# Patient Record
Sex: MALE | Race: WHITE | HISPANIC OR LATINO | Employment: PART TIME | URBAN - METROPOLITAN AREA
[De-identification: names, ages, dates, MRNs, and addresses within clinical notes are randomized per-mention and may not be internally consistent; named-entity substitution may affect disease eponyms.]

---

## 2021-04-14 LAB
CHOLESTEROL (EXTERNAL): 139 MG/DL
CREATININE (EXTERNAL): 0.81 MG/DL (ref 0.7–1.25)
GFR ESTIMATED (EXTERNAL): 92 ML/MIN/1.73M2
GFR ESTIMATED (IF AFRICAN AMERICAN) (EXTERNAL): 107 ML/MIN/1.73M2
GLUCOSE (EXTERNAL): 413 MG/DL (ref 65–99)
HDLC SERPL-MCNC: 30 MG/DL
LDL CHOLESTEROL (EXTERNAL): 68 MG/DL
NON HDL CHOLESTEROL (EXTERNAL): 109 MG/DL
POTASSIUM (EXTERNAL): 3.8 MMOL/L (ref 3.5–5.3)
TRIGLYCERIDES (EXTERNAL): 379 MG/DL

## 2021-05-03 LAB — HBA1C MFR BLD: 10.3 %

## 2021-06-02 ENCOUNTER — RECORDS - HEALTHEAST (OUTPATIENT)
Dept: ADMINISTRATIVE | Facility: CLINIC | Age: 66
End: 2021-06-02

## 2022-05-13 ENCOUNTER — TRANSFERRED RECORDS (OUTPATIENT)
Dept: HEALTH INFORMATION MANAGEMENT | Facility: CLINIC | Age: 67
End: 2022-05-13

## 2022-05-13 LAB
ALBUMIN (URINE) MG/SPEC: 0.5 MG/DL
ALBUMIN/CREATININE RATIO: 5 MCG/MG CREAT
CHOLESTEROL (EXTERNAL): 222 MG/DL
CREATININE (EXTERNAL): 0.83 MG/DL (ref 0.7–1.25)
CREATININE (URINE): 104 MG/DL (ref 20–230)
GFR ESTIMATED (EXTERNAL): 90 ML/MIN/1.73M2
GFR ESTIMATED (IF AFRICAN AMERICAN) (EXTERNAL): 105 ML/MIN/1.73M2
GLUCOSE (EXTERNAL): 354 MG/DL (ref 65–99)
HBA1C MFR BLD: 8.5 %
HDLC SERPL-MCNC: 32 MG/DL
NON HDL CHOLESTEROL (EXTERNAL): 190 MG/DL
POTASSIUM (EXTERNAL): 4.4 MMOL/L (ref 3.5–5.3)
TRIGLYCERIDES (EXTERNAL): 467 MG/DL
TSH SERPL-ACNC: 3.57 MIU/L (ref 0.4–4.5)

## 2022-09-09 ENCOUNTER — OFFICE VISIT (OUTPATIENT)
Dept: FAMILY MEDICINE | Facility: CLINIC | Age: 67
End: 2022-09-09
Payer: COMMERCIAL

## 2022-09-09 VITALS
DIASTOLIC BLOOD PRESSURE: 77 MMHG | SYSTOLIC BLOOD PRESSURE: 117 MMHG | RESPIRATION RATE: 18 BRPM | OXYGEN SATURATION: 100 % | HEART RATE: 97 BPM | WEIGHT: 134.12 LBS

## 2022-09-09 DIAGNOSIS — H69.92 DYSFUNCTION OF LEFT EUSTACHIAN TUBE: Primary | ICD-10-CM

## 2022-09-09 DIAGNOSIS — H93.12 TINNITUS, LEFT: ICD-10-CM

## 2022-09-09 PROCEDURE — 99203 OFFICE O/P NEW LOW 30 MIN: CPT | Mod: GC | Performed by: STUDENT IN AN ORGANIZED HEALTH CARE EDUCATION/TRAINING PROGRAM

## 2022-09-09 RX ORDER — FLUTICASONE PROPIONATE 50 MCG
2 SPRAY, SUSPENSION (ML) NASAL DAILY
Qty: 15.8 ML | Refills: 1 | Status: SHIPPED | OUTPATIENT
Start: 2022-09-09 | End: 2022-11-28

## 2022-09-09 NOTE — PROGRESS NOTES
"  Assessment & Plan     1. Dysfunction of left eustachian tube  Mild ear effusion suggests left eustachian tube dysfunction. Unclear etiology -- allergies (though doesn't fit with his usual spring time sx)? Evidence of some irritation in the ear likely 2/2 to topical alcohol. No evidence of OM vs OE vs other overt infection.    - Monitor   - Avoid alcohol, clearly discussed   - fluticasone (FLONASE) 50 MCG/ACT nasal spray; Spray 2 sprays into both nostrils daily  Dispense: 15.8 mL; Refill: 1  - Discussed warning signs as well as reasons to call ED vs clinic     2. Tinnitus, left  Most likely benign and related to age related hearing loss. Warrants testing given unilateral.   - Adult Audiology  Referral; Future    **Requested he return for follow up visit regarding his diabetes with his medications in-hand     *Regarding complexity of MDM:  1. Number/complexity of conditions: 2 uncomplicated new conditions    2. Amount/complexity of data reviewed/analyzed: low    3. Risk of complications of management: moderate - med management    Warrants level 40402 code (new pt)    Jewell Quintanilla DO  [facility]    Precepted with Dr. Juan Carr   Moustapha is a 67 year old presenting for the following health issues:  Ear Problem (Pressure on L causing pt to not hear well and when he eats he can feel it. 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 Not sure    Slowly came on progressively   Any swimming? No    Allergies? Sometimes - in spring, not in the summer or fall      Course: slowly getting more pronounced   Never prior to that     Exacerbating factors: nothing   Remitting factors: nothing. Did try some rubbing alcohol in the ear which didn't seem to help.    Symptoms associated/denied (as below):    "   Tinnitus -- yes. 3 years.   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 Alpha. None in the ear.     T - no   A - no   I - none         Review of Systems   Constitutional, HEENT, cardiovascular, pulmonary, GI, , musculoskeletal, neuro, skin, endocrine and psych systems are negative, except as otherwise noted.      Objective    /77   Pulse 97   Resp 18   Wt 60.8 kg (134 lb 1.9 oz)   SpO2 100%   There is no height or weight on file to calculate BMI.  Physical Exam     Gen: Pleasant. No distress.    HEENT: MMM.   Right ear: canal clear with TM gray and bony landmarks visualized without erythema nor purulence.    Left ear: no pain with pulling the external helix / tragus. canal clear, some evidence of canal irritation with mild TM effusion but landmarks well visualized without erythema nor purulence.   Neck: No overt asymmetry.   CV: Appears well-perfused.    Resp: Breathing comfortably on room air.   Abd: Non-distended, non-tender.   Ext: No edema or overt asymmetry/deformity.   Skin: No overt rash on easily visualized skin.   Neuro: Non-focal.   Psych: Calm.

## 2022-09-13 ENCOUNTER — TRANSFERRED RECORDS (OUTPATIENT)
Dept: HEALTH INFORMATION MANAGEMENT | Facility: CLINIC | Age: 67
End: 2022-09-13

## 2022-09-14 ENCOUNTER — OFFICE VISIT (OUTPATIENT)
Dept: FAMILY MEDICINE | Facility: CLINIC | Age: 67
End: 2022-09-14
Payer: COMMERCIAL

## 2022-09-14 VITALS
DIASTOLIC BLOOD PRESSURE: 78 MMHG | BODY MASS INDEX: 23.92 KG/M2 | OXYGEN SATURATION: 97 % | SYSTOLIC BLOOD PRESSURE: 120 MMHG | WEIGHT: 135 LBS | HEIGHT: 63 IN | TEMPERATURE: 97.6 F | HEART RATE: 99 BPM

## 2022-09-14 DIAGNOSIS — Z13.9 SCREENING FOR CONDITION: ICD-10-CM

## 2022-09-14 DIAGNOSIS — Z23 NEED FOR PROPHYLACTIC VACCINATION AND INOCULATION AGAINST INFLUENZA: ICD-10-CM

## 2022-09-14 DIAGNOSIS — E11.9 TYPE 2 DIABETES MELLITUS WITHOUT COMPLICATION, WITHOUT LONG-TERM CURRENT USE OF INSULIN (H): Primary | ICD-10-CM

## 2022-09-14 DIAGNOSIS — K08.9 POOR DENTITION: ICD-10-CM

## 2022-09-14 LAB
ANION GAP SERPL CALCULATED.3IONS-SCNC: 9 MMOL/L (ref 7–15)
BUN SERPL-MCNC: 13.6 MG/DL (ref 8–23)
CALCIUM SERPL-MCNC: 9.4 MG/DL (ref 8.8–10.2)
CHLORIDE SERPL-SCNC: 96 MMOL/L (ref 98–107)
CHOLEST SERPL-MCNC: 176 MG/DL
CREAT SERPL-MCNC: 0.5 MG/DL (ref 0.67–1.17)
DEPRECATED HCO3 PLAS-SCNC: 28 MMOL/L (ref 22–29)
GFR SERPL CREATININE-BSD FRML MDRD: >90 ML/MIN/1.73M2
GLUCOSE SERPL-MCNC: 394 MG/DL (ref 70–99)
HBA1C MFR BLD: 13.1 % (ref 0–5.6)
HDLC SERPL-MCNC: 45 MG/DL
LDLC SERPL CALC-MCNC: 98 MG/DL
NONHDLC SERPL-MCNC: 131 MG/DL
POTASSIUM SERPL-SCNC: 4.6 MMOL/L (ref 3.4–5.3)
SODIUM SERPL-SCNC: 133 MMOL/L (ref 136–145)
TRIGL SERPL-MCNC: 167 MG/DL

## 2022-09-14 PROCEDURE — G0008 ADMIN INFLUENZA VIRUS VAC: HCPCS | Performed by: STUDENT IN AN ORGANIZED HEALTH CARE EDUCATION/TRAINING PROGRAM

## 2022-09-14 PROCEDURE — 82043 UR ALBUMIN QUANTITATIVE: CPT | Performed by: STUDENT IN AN ORGANIZED HEALTH CARE EDUCATION/TRAINING PROGRAM

## 2022-09-14 PROCEDURE — 90662 IIV NO PRSV INCREASED AG IM: CPT | Performed by: STUDENT IN AN ORGANIZED HEALTH CARE EDUCATION/TRAINING PROGRAM

## 2022-09-14 PROCEDURE — 80048 BASIC METABOLIC PNL TOTAL CA: CPT | Performed by: STUDENT IN AN ORGANIZED HEALTH CARE EDUCATION/TRAINING PROGRAM

## 2022-09-14 PROCEDURE — G0009 ADMIN PNEUMOCOCCAL VACCINE: HCPCS | Performed by: STUDENT IN AN ORGANIZED HEALTH CARE EDUCATION/TRAINING PROGRAM

## 2022-09-14 PROCEDURE — 86803 HEPATITIS C AB TEST: CPT | Performed by: STUDENT IN AN ORGANIZED HEALTH CARE EDUCATION/TRAINING PROGRAM

## 2022-09-14 PROCEDURE — 80061 LIPID PANEL: CPT | Performed by: STUDENT IN AN ORGANIZED HEALTH CARE EDUCATION/TRAINING PROGRAM

## 2022-09-14 PROCEDURE — 99214 OFFICE O/P EST MOD 30 MIN: CPT | Mod: 25 | Performed by: STUDENT IN AN ORGANIZED HEALTH CARE EDUCATION/TRAINING PROGRAM

## 2022-09-14 PROCEDURE — 83036 HEMOGLOBIN GLYCOSYLATED A1C: CPT | Performed by: STUDENT IN AN ORGANIZED HEALTH CARE EDUCATION/TRAINING PROGRAM

## 2022-09-14 PROCEDURE — 36415 COLL VENOUS BLD VENIPUNCTURE: CPT | Performed by: STUDENT IN AN ORGANIZED HEALTH CARE EDUCATION/TRAINING PROGRAM

## 2022-09-14 PROCEDURE — 90677 PCV20 VACCINE IM: CPT | Performed by: STUDENT IN AN ORGANIZED HEALTH CARE EDUCATION/TRAINING PROGRAM

## 2022-09-14 RX ORDER — AMLODIPINE BESYLATE 10 MG/1
TABLET ORAL
COMMUNITY
Start: 2022-05-25 | End: 2022-09-26

## 2022-09-14 RX ORDER — LISINOPRIL 10 MG/1
TABLET ORAL
COMMUNITY
Start: 2022-05-25 | End: 2022-09-26

## 2022-09-14 RX ORDER — GLIPIZIDE 10 MG/1
TABLET, EXTENDED RELEASE ORAL
COMMUNITY
Start: 2022-08-18 | End: 2022-09-26

## 2022-09-14 RX ORDER — ATORVASTATIN CALCIUM 40 MG/1
TABLET, FILM COATED ORAL
COMMUNITY
Start: 2022-05-25 | End: 2022-09-26

## 2022-09-14 NOTE — PROGRESS NOTES
Addendum   Staff attempted several calls to update of result and need for follow up visit   I called also without answer   Will continue attempts     When we make contact would discuss:  - Diabetes: A1c much higher than expected at 13. Request he bring his medications in hand to next visit. Will need to start other meds; most likely insulin. Consider titrating SGLT2/GLP1 to extent possible. Request PharmD visit in near future as well, staff to coordinate.   - Positive Hep C Ab -- need to continue workup with more hx (tattoos, IV drugs, high risk sex, etc?), viral load, RUQ US, CBC, complete metabolic panel (with LFTs), INR, HIV testing, hepatitis B testing  - LDL <100, technically at goal without prior CV event. Though ASCVD very high in 20s. On HI statin. No indication ASA given age (and new guidelines).     Assessment & Plan     Type 2 diabetes mellitus without complication, without long-term current use of insulin (H)  No recent A1c's on file. Home checks (when he takes them) seem reasonably close to goal (or at least not far above). BP at goal given diabetes.   - Continue working on prior records, appreciate staff assistance   - Labs today    - Hemoglobin A1c; Future   - Lipid panel; Future   - Albmin Random Urine Quantitative with Creat Ratio; Future  - Await A1c before deciding on ongoing BS checks   - NC FOOT EXAM NO CHARGE  - Adult Eye  Referral; Future  - Continue metformin/glipizide at current dosing for now    Revisit other meds as needed pending A1c     Screening for condition  Agreeable to obtaining while getting blood work   - Hepatitis C antibody    Need for prophylactic vaccination and inoculation against influenza  - INFLUENZA, QUAD, HIGH DOSE, PF, 65YR + (FLUZONE HD)    Poor dentition  Noted on exam today  -Provided names and numbers of local dentists   -Would encourage close dental care anastacio given diabetes     *Regarding complexity of MDM:  1. Number/complexity of conditions: moderate - 1  "condition highly uncontrolled (diabetes) with high risk for severe health consequences if not addressed in near future    2. Amount/complexity of data reviewed/analyzed: low    3. Risk of complications of management: moderate - med management    Warrants level 49356 code    DO KELSEA Acevedo [facility]    Precepted with Dr. Rosenstein          Bruno Gerard is a 67 year old hx T2DM presenting for the following health issues:  Follow Up and Recheck Medication (Completed medications reconciliation)      HPI     T2DM  Last A1c on file 10.7 in  (no prior records yet)   Home sugars: every once in awhile    Fastin-140    Mealtime: rarely checks.. 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 Can't even remember how much he smoked. Just small amount.      Immunizations: agreeable to flu and pneumococcal       Review of Systems   Constitutional, HEENT, cardiovascular, pulmonary, GI, , musculoskeletal, neuro, skin, endocrine and psych systems are negative, except as otherwise noted.      Objective    /78   Pulse 99   Temp 97.6  F (36.4  C) (Oral)   Ht 1.6 m (5' 2.99\")   Wt 61.2 kg (135 lb)   SpO2 97%   BMI 23.92 kg/m    Body mass index is 23.92 kg/m .  Physical Exam     Gen: Pleasant. No distress.    HEENT: MMM. Poor dentition.    Neck: No overt asymmetry.   CV: Appears well-perfused. RRR. No murmur.   Resp: Breathing comfortably on room air. Lungs clear to auscultation bilaterally without wheeze or crackle.   Abd: Non-distended, non-tender.   Ext: No " edema or overt asymmetry/deformity.   Skin: No overt rash on easily visualized skin.   Neuro: Non-focal.   Psych: Calm.   Diabetic foot exam: normal DP and PT pulses, no trophic changes or ulcerative lesions and normal sensory exam

## 2022-09-14 NOTE — PROGRESS NOTES
Preceptor Attestation:   Patient seen, evaluated and discussed with the resident Dr. Quintanilla. I have verified the content of the note, which accurately reflects my assessment of the patient and the plan of care.    Last A1c 10.7% in 2012. Unclear medical management, medication management in interim.     Supervising Physician:Benjamin Rosenstein, MD, MA  [facility]

## 2022-09-14 NOTE — Clinical Note
Sent staff message to [facility] regarding high A1c  Would like to see back quickly to address meds / ongoing care  Let me know if other ideas, thoughts

## 2022-09-15 ENCOUNTER — DOCUMENTATION ONLY (OUTPATIENT)
Dept: FAMILY MEDICINE | Facility: CLINIC | Age: 67
End: 2022-09-15

## 2022-09-15 LAB
CREAT UR-MCNC: 42.6 MG/DL
MICROALBUMIN UR-MCNC: <12 MG/L
MICROALBUMIN/CREAT UR: NORMAL MG/G{CREAT}

## 2022-09-15 NOTE — PROGRESS NOTES
Called pt no answer LVMTCB to make appt for a follow up on DM due to high A1C per PCP. I will call again later. I did leave a message stating that he can ask to talk to me or request an  if I am not available.

## 2022-09-16 LAB — HCV AB SERPL QL IA: REACTIVE

## 2022-09-18 PROBLEM — K08.9 POOR DENTITION: Status: ACTIVE | Noted: 2022-09-18

## 2022-09-18 PROBLEM — E11.9 TYPE 2 DIABETES MELLITUS WITHOUT COMPLICATION, WITHOUT LONG-TERM CURRENT USE OF INSULIN (H): Status: ACTIVE | Noted: 2022-09-18

## 2022-09-18 RX ORDER — BLOOD-GLUCOSE METER
EACH MISCELLANEOUS
COMMUNITY
Start: 2022-05-27 | End: 2022-09-26

## 2022-09-18 RX ORDER — BLOOD SUGAR DIAGNOSTIC
STRIP MISCELLANEOUS
COMMUNITY
Start: 2022-05-27 | End: 2022-09-26

## 2022-09-18 RX ORDER — LANCETS 30 GAUGE
EACH MISCELLANEOUS
COMMUNITY
Start: 2022-05-27 | End: 2022-09-26

## 2022-09-19 NOTE — PROGRESS NOTES
Faculty Supervision of Residents   I have examined this patient and the medical care has been evaluated and discussed with the resident. See resident note outlining our discussion.      Nereida Martinez MD

## 2022-09-25 NOTE — PROGRESS NOTES
Addendum   Discussed with team / referral coordinator   The MELITON was signed and request was sent to the clinic   We are awaiting transfer of records from [place]   Referral coordinator made a follow up call 9/28     Assessment & Plan     Type 2 diabetes mellitus with other specified complication, without long-term current use of insulin (H)  A1c 13 last week on metformin / ALTAMIRANO (reports adherence). More poorly controlled than expected. Poor diet.   - PharmD refilled diabetic supplies   - We both instructed to increase glucose checks    At least 1 fasting daily    If not more    - Re-emphasized diet discussion as noted last visit    Looks as though PharmD will focus on this more at upcoming visit, appreciate   - Did discuss he would like GLP-1 over insulin for now   - Please see PharmD notes regarding meds (metformin, ALTAMIRANO, GLP1) and dosing, appreciate -- will follow   - PharmD refilled other meds associated with this dx, appreciate    - lisinopril (ZESTRIL) 10 MG tablet; Take 1 tablet (10 mg) by mouth daily  Dispense: 90 tablet; Refill: 3   - atorvastatin (LIPITOR) 40 MG tablet; Take 1 tablet (40 mg) by mouth daily  Dispense: 90 tablet; Refill: 3    Abnormal laboratory test  Screening Hep C Ab positive last visit. Does have risk factor of unintentional needle sticks. Agreeable to confirmatory testing / related screening today.   - Hepatitis C RNA, Quantitative by PCR  - CBC with platelets  - Comprehensive metabolic panel  - HIV Ag/Ab Screen [facility]  - Hepatitis B Surface Ag  - If positive, would do RUQ ultrasound as well     Benign essential hypertension  BP at goal <140/90 given age / hx T2DM.   - amLODIPine (NORVASC) 10 MG tablet; Take 1 tablet (10 mg) by mouth daily  Dispense: 90 tablet; Refill: 3    PharmD ordered refills, appreciate     Jewell Quintanilla DO   [facility]    Precepted with Dr. 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    Fasting hasn't checked (said it was 130 last time)    Mealtime? Hasn't checked  Diet - \"not good\"        beans, rice, chicken        Does not eat vegetables        Almost no sugar / no pop        Black coffee        Any changes since last time? 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 Yes        He did not bring his meds today as requested        Metformin        Glipizide        Atorvastatin        Lisinopril        Amlodipine        Thoughts on new medicines?    Insulin? Less excited    GLP-1 vs other? Would like to do more      Hep C follow up:  Screened positive with lab work last visit   Any of the following? (tattoos, needlestick injury, IV drugs, high risk sex [MSM], etc?) - had an accidental needlestick at work (as a ) -- people leave needles in their clothes in the locker room. Otherwise no.   Open to following labs? viral load, RUQ US, CBC, complete metabolic panel (with LFTs), INR, HIV testing, hepatitis B testing    Review of Systems   Constitutional, HEENT, cardiovascular, pulmonary, GI, , musculoskeletal, neuro, skin, endocrine and psych systems are negative, except as otherwise noted.      Objective    /69   Pulse 106   Wt 62.6 kg (138 lb 0.6 oz)   SpO2 96%   BMI 24.46 kg/m    Body mass index is 24.46 kg/m .  Physical Exam     Gen: Pleasant. No distress.    HEENT: MMM.   Neck: No overt asymmetry.   CV: Appears well-perfused. RRR.   Resp: Breathing comfortably on room air  Abd: Non-distended, non-tender.   Ext: No edema or overt asymmetry/deformity.   Skin: No overt " rash on easily visualized skin.   Neuro: Non-focal.   Psych: Calm.

## 2022-09-26 ENCOUNTER — OFFICE VISIT (OUTPATIENT)
Dept: EDUCATION SERVICES | Facility: CLINIC | Age: 67
End: 2022-09-26
Payer: COMMERCIAL

## 2022-09-26 ENCOUNTER — OFFICE VISIT (OUTPATIENT)
Dept: FAMILY MEDICINE | Facility: CLINIC | Age: 67
End: 2022-09-26
Payer: COMMERCIAL

## 2022-09-26 VITALS
DIASTOLIC BLOOD PRESSURE: 69 MMHG | OXYGEN SATURATION: 96 % | WEIGHT: 138.04 LBS | HEART RATE: 106 BPM | SYSTOLIC BLOOD PRESSURE: 112 MMHG | BODY MASS INDEX: 24.46 KG/M2

## 2022-09-26 DIAGNOSIS — I10 BENIGN ESSENTIAL HYPERTENSION: ICD-10-CM

## 2022-09-26 DIAGNOSIS — E11.69 TYPE 2 DIABETES MELLITUS WITH OTHER SPECIFIED COMPLICATION, WITHOUT LONG-TERM CURRENT USE OF INSULIN (H): ICD-10-CM

## 2022-09-26 DIAGNOSIS — E11.9 TYPE 2 DIABETES MELLITUS WITHOUT COMPLICATION, WITHOUT LONG-TERM CURRENT USE OF INSULIN (H): Primary | ICD-10-CM

## 2022-09-26 DIAGNOSIS — R89.9 ABNORMAL LABORATORY TEST: Primary | ICD-10-CM

## 2022-09-26 LAB
ALBUMIN SERPL BCG-MCNC: 3.9 G/DL (ref 3.5–5.2)
ALP SERPL-CCNC: 66 U/L (ref 40–129)
ALT SERPL W P-5'-P-CCNC: 97 U/L (ref 10–50)
ANION GAP SERPL CALCULATED.3IONS-SCNC: 11 MMOL/L (ref 7–15)
AST SERPL W P-5'-P-CCNC: 55 U/L (ref 10–50)
BILIRUB SERPL-MCNC: 0.4 MG/DL
BUN SERPL-MCNC: 20.4 MG/DL (ref 8–23)
CALCIUM SERPL-MCNC: 9.6 MG/DL (ref 8.8–10.2)
CHLORIDE SERPL-SCNC: 97 MMOL/L (ref 98–107)
CREAT SERPL-MCNC: 0.5 MG/DL (ref 0.67–1.17)
CREAT UR-MCNC: 50.8 MG/DL
DEPRECATED HCO3 PLAS-SCNC: 24 MMOL/L (ref 22–29)
ERYTHROCYTE [DISTWIDTH] IN BLOOD BY AUTOMATED COUNT: 13.4 % (ref 10–15)
GFR SERPL CREATININE-BSD FRML MDRD: >90 ML/MIN/1.73M2
GLUCOSE SERPL-MCNC: 337 MG/DL (ref 70–99)
HCT VFR BLD AUTO: 39 % (ref 40–53)
HGB BLD-MCNC: 13.4 G/DL (ref 13.3–17.7)
MCH RBC QN AUTO: 30 PG (ref 26.5–33)
MCHC RBC AUTO-ENTMCNC: 34.4 G/DL (ref 31.5–36.5)
MCV RBC AUTO: 87 FL (ref 78–100)
MICROALBUMIN UR-MCNC: <12 MG/L
MICROALBUMIN/CREAT UR: NORMAL MG/G{CREAT}
PLATELET # BLD AUTO: 265 10E3/UL (ref 150–450)
POTASSIUM SERPL-SCNC: 3.9 MMOL/L (ref 3.4–5.3)
PROT SERPL-MCNC: 6 G/DL (ref 6.4–8.3)
RBC # BLD AUTO: 4.47 10E6/UL (ref 4.4–5.9)
SODIUM SERPL-SCNC: 132 MMOL/L (ref 136–145)
WBC # BLD AUTO: 6.4 10E3/UL (ref 4–11)

## 2022-09-26 PROCEDURE — 82043 UR ALBUMIN QUANTITATIVE: CPT | Performed by: STUDENT IN AN ORGANIZED HEALTH CARE EDUCATION/TRAINING PROGRAM

## 2022-09-26 PROCEDURE — 87340 HEPATITIS B SURFACE AG IA: CPT | Performed by: STUDENT IN AN ORGANIZED HEALTH CARE EDUCATION/TRAINING PROGRAM

## 2022-09-26 PROCEDURE — 85027 COMPLETE CBC AUTOMATED: CPT | Performed by: STUDENT IN AN ORGANIZED HEALTH CARE EDUCATION/TRAINING PROGRAM

## 2022-09-26 PROCEDURE — 87522 HEPATITIS C REVRS TRNSCRPJ: CPT | Performed by: STUDENT IN AN ORGANIZED HEALTH CARE EDUCATION/TRAINING PROGRAM

## 2022-09-26 PROCEDURE — 87389 HIV-1 AG W/HIV-1&-2 AB AG IA: CPT | Performed by: STUDENT IN AN ORGANIZED HEALTH CARE EDUCATION/TRAINING PROGRAM

## 2022-09-26 PROCEDURE — 99214 OFFICE O/P EST MOD 30 MIN: CPT | Mod: GC | Performed by: STUDENT IN AN ORGANIZED HEALTH CARE EDUCATION/TRAINING PROGRAM

## 2022-09-26 PROCEDURE — 36415 COLL VENOUS BLD VENIPUNCTURE: CPT | Performed by: STUDENT IN AN ORGANIZED HEALTH CARE EDUCATION/TRAINING PROGRAM

## 2022-09-26 PROCEDURE — 80053 COMPREHEN METABOLIC PANEL: CPT | Performed by: STUDENT IN AN ORGANIZED HEALTH CARE EDUCATION/TRAINING PROGRAM

## 2022-09-26 PROCEDURE — G0108 DIAB MANAGE TRN  PER INDIV: HCPCS | Performed by: PHARMACIST

## 2022-09-26 PROCEDURE — 99207 PR DROP WITH A PROCEDURE: CPT | Performed by: PHARMACIST

## 2022-09-26 RX ORDER — AMLODIPINE BESYLATE 10 MG/1
10 TABLET ORAL DAILY
Qty: 90 TABLET | Refills: 3 | Status: SHIPPED | OUTPATIENT
Start: 2022-09-26 | End: 2023-03-15

## 2022-09-26 RX ORDER — AMLODIPINE BESYLATE 10 MG/1
10 TABLET ORAL DAILY
Start: 2022-09-26 | End: 2022-09-26

## 2022-09-26 RX ORDER — LISINOPRIL 10 MG/1
10 TABLET ORAL DAILY
Qty: 90 TABLET | Refills: 3 | Status: SHIPPED | OUTPATIENT
Start: 2022-09-26 | End: 2023-03-15

## 2022-09-26 RX ORDER — ATORVASTATIN CALCIUM 40 MG/1
40 TABLET, FILM COATED ORAL DAILY
Qty: 90 TABLET | Refills: 3 | Status: SHIPPED | OUTPATIENT
Start: 2022-09-26 | End: 2023-03-15

## 2022-09-26 RX ORDER — ATORVASTATIN CALCIUM 40 MG/1
40 TABLET, FILM COATED ORAL DAILY
Start: 2022-09-26 | End: 2022-09-26

## 2022-09-26 RX ORDER — GLIPIZIDE 10 MG/1
20 TABLET, FILM COATED, EXTENDED RELEASE ORAL DAILY
Qty: 180 TABLET | Refills: 3 | Status: SHIPPED | OUTPATIENT
Start: 2022-09-26 | End: 2023-03-15

## 2022-09-26 RX ORDER — GLIPIZIDE 10 MG/1
20 TABLET, EXTENDED RELEASE ORAL DAILY
Start: 2022-09-26 | End: 2022-09-26

## 2022-09-26 RX ORDER — LISINOPRIL 10 MG/1
10 TABLET ORAL DAILY
Start: 2022-09-26 | End: 2022-09-26

## 2022-09-26 RX ORDER — SEMAGLUTIDE 1.34 MG/ML
0.25 INJECTION, SOLUTION SUBCUTANEOUS WEEKLY
Qty: 1.5 ML | Refills: 1 | Status: SHIPPED | OUTPATIENT
Start: 2022-09-26 | End: 2022-10-24

## 2022-09-26 NOTE — PROGRESS NOTES
Diabetes Self-Management Education & Support    Presents for: Individual review    Type of Visit: In Person with     How would patient like to obtain AVS? Printed for patient    ASSESSMENT:    A1c above goal, needing additional therapy. Injectable agent warranted given elevation of A1c. Ideal to opt for GLP1 instead of insulin given long term risks of insulin.     Patient's most recent   Lab Results   Component Value Date    A1C 13.1 09/14/2022    A1C 10.7 01/12/2012    is not meeting goal of <7.0    Diabetes knowledge and skills assessment:   Patient is knowledgeable in diabetes management concepts related to: Taking Medication    Continue education with the following diabetes management concepts: Healthy Eating, Being Active, Monitoring, Taking Medication, Problem Solving, Reducing Risks and Healthy Coping    Based on learning assessment above, most appropriate setting for further diabetes education would be: Individual setting.    INTERVENTIONS:    Education provided today on:  AADE Self-Care Behaviors:  There are no care plans that you recently modified to display for this patient.    Counseling today provided on self-monitor blood glucose frequency, blood glucose goals, GLP1 injection techqnique    Opportunities for ongoing education and support in diabetes-self management were discussed. Pt verbalized understanding of concepts discussed and recommendations provided today.       Education Materials Provided:  No new materials provided today          PLAN    Start checking blood glucose once per day in the morning  Change Glipizide from 10 mg ER twice daily to 20 mg ER once daily in the morning  Start GLP1 - Ozempic 0.25 mg/week    Topics to cover at upcoming visits: Healthy Eating, Being Active and Monitoring  Follow-up: 1 month    See Goals Section for co-developed, patient-stated behavior change goals.  AVS provided to patient today.          SUBJECTIVE / OBJECTIVE:  Presents for:  "Individual review  Accompanied by: Self  Diabetes education in the past 24mo: No  Focus of Visit: Taking Medication, Monitoring  Diabetes type: Type 2  Disease course: Stable  Transportation concerns: No  Difficulty affording diabetes medication?: No  Other concerns:: Language barrier  Cultural Influences/Ethnic Background:   or       Diabetes Symptoms & Complications:     Complications assessed today?: No    Patient Problem List and Family Medical History reviewed for relevant medical history, current medical status, and diabetes risk factors.    Vitals:  There were no vitals taken for this visit.  Estimated body mass index is 24.46 kg/m  as calculated from the following:    Height as of 9/14/22: 1.6 m (5' 2.99\").    Weight as of an earlier encounter on 9/26/22: 62.6 kg (138 lb 0.6 oz).   Last 3 BP:   BP Readings from Last 3 Encounters:   09/26/22 112/69   09/14/22 120/78   09/09/22 117/77       History   Smoking Status     Never Smoker   Smokeless Tobacco     Never Used       Labs:  Lab Results   Component Value Date    A1C 13.1 09/14/2022    A1C 10.7 01/12/2012     Lab Results   Component Value Date     09/14/2022    .0 03/04/2011     Lab Results   Component Value Date    LDL 98 09/14/2022     Direct Measure HDL   Date Value Ref Range Status   09/14/2022 45 >=40 mg/dL Final   ]  GFR Estimate   Date Value Ref Range Status   09/14/2022 >90 >60 mL/min/1.73m2 Final     Comment:     Effective December 21, 2021 eGFRcr in adults is calculated using the 2021 CKD-EPI creatinine equation which includes age and gender (Andres rodriguez al., NEJ, DOI: 10.1056/QKOJvg7501299)     No results found for: GFRESTBLACK  Lab Results   Component Value Date    CR 0.50 09/14/2022    CR 0.8 03/04/2011     No results found for: MICROALBUMIN    Healthy Eating:  Healthy Eating Assessed Today: No - 2 meals per day. 5pm second meal, 7 am first meal per day, small meal for lunch. Takes medicines at meal times. Typical " foods beans, rice, meat. Works 6 am until 1230pm. Retired so can't work all day due to Social Security. [employer].     Being Active:  Being Active Assessed Today: No    Monitoring:  Monitoring Assessed Today: Yes (Barriers to monitoring include pain) - Doesn't like to check blood glucose, doesn't do often.    Did patient bring glucose meter to appointment? : No    Taking Medications:  Diabetes Medication(s)     Biguanides       metFORMIN (GLUCOPHAGE) 1000 MG tablet    Take 1 tablet (1,000 mg) by mouth 2 times daily (with meals)    Sulfonylureas       glipiZIDE (GLIPIZIDE XL) 10 MG 24 hr tablet    Take 2 tablets (20 mg) by mouth daily    Incretin Mimetic Agents (GLP-1 Receptor Agonists)       semaglutide (OZEMPIC, 0.25 OR 0.5 MG/DOSE,) 2 MG/1.5ML SOPN pen    Inject 0.25 mg Subcutaneous once a week for 28 days          Taking Medication Assessed Today: Yes - Taking Atorvastatin 40 mg/day, Glipizide ER 10 mg twice daily, Metformin 1 g twice daily. For blood pressure - takes Amlodipine 10mg/day and Lisinopril 10 mg/day. Takes one of them in the morning and one of them in the evening. Takes medicines with mealtimes.  Current Treatments: Oral Medication (taken by mouth)  Problems taking diabetes medications regularly?: No  Diabetes medication side effects?: No    Problem Solving:  Problem Solving Assessed Today: No  Is the patient at risk for hypoglycemia?: Yes (Not addressed today)              Reducing Risks:  Reducing Risks Assessed Today: No  Has dilated eye exam at least once a year?:  (not addressed)  Sees dentist every 6 months?:  (not addressed)  Feet checked by healthcare provider in the last year?:  (not addressed)    Healthy Coping:  Healthy Coping Assessed Today: No  Patient Activation Measure Survey Score:  No flowsheet data found.          Fani Rosenthal, Pharm.D., CDCES [facility]  Phone: [phone]  September 26, 2022 at 4:54 PM      Time Spent: 60  minutes  Encounter Type: Individual        Any diabetes medication dose changes were made via the Certified Diabetes Care & Education Protocol in collaboration with the patient's primary care provider. A copy of this encounter was shared with the provider.

## 2022-09-26 NOTE — PATIENT INSTRUCTIONS
- Check blood glucose one time per day, either in the morning before work or before your breakfast meal  - Your goal blood glucose at these times is to see a number between  mg/dL    Sharps Disposal Instructions for [facility]    For the safety of yourself and others:   - Do not put needles, lancets or syringes in the trash.  - Do not flush needles, lancets or syringes down the toilet or drain.    Instead:  1) Collect used needles, lancets and syringes in a plastic container with a tight fitting lid.   --- You can buy a sharps container at a drugstore or medical supply store. Or you can use an empty laundry detergent bottle or any other puncture-proof container and lid.  2) Dispose of your needles, lancets and syringes at a hazardous waste collection site for free.    Collection Site        [place] [address] [address] [place] Hours    April - November Tuesday - Friday: 11 a.m. - 6 p.m.  Saturday: 9 a.m. - 4 p.m.    December - March Friday and Saturday: 9 a.m. - 4 p.m.     For additional information, call 24 hour Recycling & Disposal Hotline at [phone]      For sharps disposal information for other [facility]:   - Call your local waste company to ask about removing the sharps container.   - You can also check with the Coalition for Safe Community Needle Disposal at 682-322-7787 or www.safeneedledisposal.org.

## 2022-09-27 LAB
HBV SURFACE AG SERPL QL IA: NONREACTIVE
HIV 1+2 AB+HIV1 P24 AG SERPL QL IA: NONREACTIVE

## 2022-09-28 LAB — HCV RNA SERPL NAA+PROBE-ACNC: NOT DETECTED IU/ML

## 2022-09-29 ENCOUNTER — DOCUMENTATION ONLY (OUTPATIENT)
Dept: FAMILY MEDICINE | Facility: CLINIC | Age: 67
End: 2022-09-29

## 2022-09-29 NOTE — PROGRESS NOTES
09/29/22    Discussed results by phone with interpretor   Reassuring -- negative Hep C RNA, negative Hep B Ag, HIV    Glucose still quite elevated and rest of BMP consistent with that   ALT/AST mildly elevated -- unclear why in setting of negative Hep B/C. Denies significant alcohol use.  Wonder if having a degree of NAFLD anastacio in setting of uncontrolled diabetes. May warrant further workup in that regard.   Remainder blood work reassuring    He has not yet picked up his new diabetes meds   I recommended he do so today or tomorrow   Re-emphasized plan as in progress note     Ruy Quintanilla,    [facility] Medicine Resident   Pager#[phone]

## 2022-09-29 NOTE — PROGRESS NOTES
Preceptor Attestation:  Patient's case reviewed and discussed with the resident, Jewell Quintanilla MD, and I personally evaluated the patient. I agree with written assessment and plan of care.    Supervising Physician:  Lauren Heath MD   [facility]

## 2022-10-22 NOTE — PROGRESS NOTES
Assessment & Plan     Chronic pain of left knee  Ddx most likely OA. Could be chronic meniscal injury. Consider inflammatory component such as gout.   - Discussed and educated on likely dx   - Elected course of treatment with meds   - acetaminophen (TYLENOL) 500 MG tablet; Take 1-2 tablets (500-1,000 mg) by mouth every 6 hours as needed for mild pain  Dispense: 30 tablet; Refill: 3  - ibuprofen (ADVIL/MOTRIN) 800 MG tablet; Take 1 tablet (800 mg) by mouth every 8 hours as needed for moderate pain  Dispense: 30 tablet; Refill: 1  - diclofenac (VOLTAREN) 1 % topical gel; Apply 2 g topically 4 times daily as needed for moderate pain  Dispense: 100 g; Refill: 1  - If no improvement, consider:    XR    PT   Uric acid vs other labs?      Type 2 diabetes mellitus with other specified complication, without long-term current use of insulin (H)  - Appreciate PharmD discussion on meds today -- agree with education/changes   - Albumin Random Urine Quantitative with Creat Ratio; Future  - Discussed need for diabetic eye exam     Future:   -F/u ear. If no better, consider ENT consult   -Vaccines / CC screen     Codin+ chronic conditions, med management, encounter affected by SDOH -- unclear how much he is understanding per interpretor.     Jewell Quintanilla, DO  [facility]    Precepted with Dr. Nya Carr   Moustapha is a 67 year old M hx diabetes  presenting for the following health issues:  Knee Pain (Left knee is a lot of pain and sometimes locks up. Right knee has a little bit of pain but not like the left. Right eye also has pain that comes and goes. 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     HPI     Knee Pain   B/l (L>R)   Onset: 1-2 years   Course: gradually gotten worse   Location: front, just off to the side   Radiation: no   Description: dull   Intensity: 5/10   Exacerbating factors:    Being on his feet at work    Worse at end of day, not bad when he gets up   Remitting  "factors:    Rest    Tylenol - once in the morning   Symptoms associated/denied (as below):      Clicks - yes   Pops - yes   No falls   No swelling   No fever nor chills   No hip or ankle pain     Review of Systems   Constitutional, HEENT, cardiovascular, pulmonary, gi and gu systems are negative, except as otherwise noted.      Objective    /83 (BP Location: Right arm, Patient Position: Sitting, Cuff Size: Adult Regular)   Pulse 94   Temp 98.5  F (36.9  C) (Oral)   Ht 1.6 m (5' 2.99\")   Wt 63 kg (139 lb)   SpO2 96%   BMI 24.63 kg/m    Body mass index is 24.63 kg/m .  Physical Exam     Gen: Pleasant. No distress.    HEENT: MMM.   Neck: No overt asymmetry.   CV: Appears well-perfused.   Resp: Breathing comfortably on room air.   Abd: Non-distended, non-tender.   Ext: No edema or overt asymmetry/deformity.   Skin: No overt rash on easily visualized skin.   Neuro: Non-focal.   Psych: Calm.     MSK (left knee):   Inspection - no edema, no effusion, no erythema   Palpation - TTP of anterior joint line. No posterior masses or otherwise. No TTP of left calf.     ROM full active and passive   Strength/Sensation - intact and full, symmetric to right   Special testing    Equivocal Taran's    Otherwise unremarkable, including lachman's, anterior/posterior drawer, varus/valgus         "

## 2022-10-24 ENCOUNTER — OFFICE VISIT (OUTPATIENT)
Dept: EDUCATION SERVICES | Facility: CLINIC | Age: 67
End: 2022-10-24
Payer: COMMERCIAL

## 2022-10-24 ENCOUNTER — OFFICE VISIT (OUTPATIENT)
Dept: FAMILY MEDICINE | Facility: CLINIC | Age: 67
End: 2022-10-24
Payer: COMMERCIAL

## 2022-10-24 VITALS
TEMPERATURE: 98.5 F | HEIGHT: 63 IN | BODY MASS INDEX: 24.63 KG/M2 | WEIGHT: 139 LBS | HEART RATE: 94 BPM | DIASTOLIC BLOOD PRESSURE: 83 MMHG | SYSTOLIC BLOOD PRESSURE: 125 MMHG | OXYGEN SATURATION: 96 %

## 2022-10-24 DIAGNOSIS — M25.562 CHRONIC PAIN OF LEFT KNEE: Primary | ICD-10-CM

## 2022-10-24 DIAGNOSIS — G89.29 CHRONIC PAIN OF LEFT KNEE: Primary | ICD-10-CM

## 2022-10-24 DIAGNOSIS — E11.69 TYPE 2 DIABETES MELLITUS WITH OTHER SPECIFIED COMPLICATION, WITHOUT LONG-TERM CURRENT USE OF INSULIN (H): ICD-10-CM

## 2022-10-24 DIAGNOSIS — E11.65 TYPE 2 DIABETES MELLITUS WITH HYPERGLYCEMIA, WITHOUT LONG-TERM CURRENT USE OF INSULIN (H): Primary | ICD-10-CM

## 2022-10-24 PROCEDURE — G0108 DIAB MANAGE TRN  PER INDIV: HCPCS | Performed by: PHARMACIST

## 2022-10-24 PROCEDURE — 82043 UR ALBUMIN QUANTITATIVE: CPT | Performed by: STUDENT IN AN ORGANIZED HEALTH CARE EDUCATION/TRAINING PROGRAM

## 2022-10-24 PROCEDURE — 99214 OFFICE O/P EST MOD 30 MIN: CPT | Mod: GC | Performed by: STUDENT IN AN ORGANIZED HEALTH CARE EDUCATION/TRAINING PROGRAM

## 2022-10-24 RX ORDER — SEMAGLUTIDE 1.34 MG/ML
0.5 INJECTION, SOLUTION SUBCUTANEOUS WEEKLY
Qty: 1.5 ML | Refills: 1 | Status: SHIPPED | OUTPATIENT
Start: 2022-10-24 | End: 2023-03-15

## 2022-10-24 RX ORDER — ACETAMINOPHEN 500 MG
500-1000 TABLET ORAL EVERY 6 HOURS PRN
Qty: 30 TABLET | Refills: 3 | Status: SHIPPED | OUTPATIENT
Start: 2022-10-24 | End: 2023-05-17

## 2022-10-24 RX ORDER — IBUPROFEN 800 MG/1
800 TABLET, FILM COATED ORAL EVERY 8 HOURS PRN
Qty: 30 TABLET | Refills: 1 | Status: SHIPPED | OUTPATIENT
Start: 2022-10-24 | End: 2023-03-15

## 2022-10-24 NOTE — PROGRESS NOTES
Diabetes Self-Management Education & Support    Presents for: Follow-up    Type of Visit: In Person    How would patient like to obtain AVS? Printed for patient    ASSESSMENT:    Concern for patient misunderstanding of intended medication taking, however also concern that I am not understanding patient's strategy despite use of . As tolerating GLP1 without concern and self-monitor blood glucose above goal, opportunity to increase dose.     Patient's most recent   Lab Results   Component Value Date    A1C 13.1 09/14/2022    A1C 10.7 01/12/2012    is not meeting goal of <7.0    Diabetes knowledge and skills assessment:   Patient is knowledgeable in diabetes management concepts related to: Monitoring    Continue education with the following diabetes management concepts: Healthy Eating, Being Active, Monitoring, Taking Medication, Problem Solving, Reducing Risks and Healthy Coping    Based on learning assessment above, most appropriate setting for further diabetes education would be: Individual setting.    INTERVENTIONS:    Education provided today on:  AADE Self-Care Behaviors:  There are no care plans that you recently modified to display for this patient.      Opportunities for ongoing education and support in diabetes-self management were discussed. Pt verbalized understanding of concepts discussed and recommendations provided today.       Education Materials Provided:  No new materials provided today          PLAN    Counseling provided on use of medicines consistently regardless of different daily circumstances. Patient agreeable.   Increase Ozempic to 0.5 mg/week. Counseling provided on resources for Medicare counseling ([facility] Linkage Line) if continued concern for medication cost.     Topics to cover at upcoming visits: Healthy Eating, Being Active and Taking Medication  Follow-up: 1 month    See Goals Section for co-developed, patient-stated behavior change goals.  AVS provided to patient  "today.          SUBJECTIVE / OBJECTIVE:  Presents for: Follow-up  Accompanied by: Self  Diabetes education in the past 24mo: Yes  Focus of Visit: Taking Medication, Monitoring  Diabetes type: Type 2  Disease course: Stable  Transportation concerns: No  Difficulty affording diabetes medication?: Yes (Concerns today for Ozempic copayment)  Difficulty affording diabetes testing supplies?:  (Unclear)  Other concerns:: Language barrier  Cultural Influences/Ethnic Background:   or       Diabetes Symptoms & Complications:     Complications assessed today?: No    Patient Problem List and Family Medical History reviewed for relevant medical history, current medical status, and diabetes risk factors.    Vitals:  There were no vitals taken for this visit.  Estimated body mass index is 24.63 kg/m  as calculated from the following:    Height as of an earlier encounter on 10/24/22: 5' 2.99\" (1.6 m).    Weight as of an earlier encounter on 10/24/22: 139 lb (63 kg).   Last 3 BP:   BP Readings from Last 3 Encounters:   10/24/22 125/83   09/26/22 112/69   09/14/22 120/78       History   Smoking Status     Never   Smokeless Tobacco     Never       Labs:  Lab Results   Component Value Date    A1C 13.1 09/14/2022    A1C 10.7 01/12/2012     Lab Results   Component Value Date     09/26/2022    .0 03/04/2011     Lab Results   Component Value Date    LDL 98 09/14/2022     Direct Measure HDL   Date Value Ref Range Status   09/14/2022 45 >=40 mg/dL Final   ]  GFR Estimate   Date Value Ref Range Status   09/26/2022 >90 >60 mL/min/1.73m2 Final     Comment:     Effective December 21, 2021 eGFRcr in adults is calculated using the 2021 CKD-EPI creatinine equation which includes age and gender (Andres rodriguez al., NEJM, DOI: 10.1056/SVUVgl7017998)     No results found for: GFRESTBLACK  Lab Results   Component Value Date    CR 0.50 09/26/2022    CR 0.8 03/04/2011     No results found for: MICROALBUMIN    Healthy " Eating:  Healthy Eating Assessed Today: No    Being Active:  Being Active Assessed Today: No    Monitoring:  Monitoring Assessed Today: Yes  Did patient bring glucose meter to appointment? : No  Times checking blood sugar at home (number): 1  Times checking blood sugar at home (per): Day  Blood glucose trend: Decreasing - Fasting blood glucose per patient report 170s, however later in the afternoon 200s.     Taking Medications:  Diabetes Medication(s)     Biguanides       metFORMIN (GLUCOPHAGE) 1000 MG tablet    Take 1 tablet (1,000 mg) by mouth 2 times daily (with meals)    Sulfonylureas       glipiZIDE (GLIPIZIDE XL) 10 MG 24 hr tablet    Take 2 tablets (20 mg) by mouth daily    Incretin Mimetic Agents (GLP-1 Receptor Agonists)       semaglutide (OZEMPIC, 0.25 OR 0.5 MG/DOSE,) 2 MG/1.5ML SOPN pen    Inject 0.5 mg Subcutaneous once a week          Taking Medication Assessed Today: Yes  Current Treatments: Non-insulin Injectables, Oral Medication (taken by mouth)  Problems taking diabetes medications regularly?: Yes - Today reports has all of the medications listed above, however doesn't take pill medicines consistently. Difficulty understanding what factors result in patient taking medicines vs not taking them. When does take them taking : Changed Glipizide 2 tablets in the morning around first meal of day. Metformin 2 tablets QAM - 1 tablet twice daily (1000 mg twice daily). Same scenario for blood pressure/cholesterol medicines: Takes Atorvastatin sometimes, but not all the time. Same scenario for Amlodipine and Lisinopril - takes sometimes. Last dose yesterday. However, started Ozempic 0.25 mg weekly on Tuesday. Denies concerns for nausea/vomiting/diarrhea.  Diabetes medication side effects?: No    Problem Solving:  Problem Solving Assessed Today: No    Reducing Risks:  Reducing Risks Assessed Today: No    Healthy Coping:  Healthy Coping Assessed Today: No  Patient Activation Measure Survey Score:  No flowsheet  data found.          Fani Rosenthal, Pharm.D., [facility]  Phone: [phone]    Time Spent: 30 minutes  Encounter Type: Individual        Any diabetes medication dose changes were made via the Certified Diabetes Care & Education Protocol in collaboration with the patient's primary care provider. A copy of this encounter was shared with the provider.

## 2022-10-24 NOTE — PATIENT INSTRUCTIONS
- Today we talked about taking the Metformin 1 tablet in the morning and 1 tablet in the evening  - Today we talked about taking the Amlodipine, Lisinopril, and Atorvastatin every day in the morning  - Increase the Ozempic to 0.5 mg weekly  - Continue taking 2 tablets of Glipizide in the morning  - If you wish to talk to someone about Medicare Plans, consider calling the [facility] Line - [phone]  - Here are the names of the continuous glucose monitors (CGM) devices we talked about today: Freestyle Geneva 2 or Dexcom G6

## 2022-10-25 LAB
CREAT UR-MCNC: 23 MG/DL
MICROALBUMIN UR-MCNC: <12 MG/L
MICROALBUMIN/CREAT UR: NORMAL MG/G{CREAT}

## 2022-10-27 NOTE — PROGRESS NOTES
Preceptor Attestation:   Patient seen, evaluated and discussed with the resident. I have verified the content of the note, which accurately reflects my assessment of the patient and the plan of care.    Supervising Physician:Wes Fay    [facility]

## 2022-11-25 NOTE — PROGRESS NOTES
Assessment & Plan     Chronic pain of left knee  Continues to be uncontrolled with minimal med usage. Ddx most likely OA. Could be chronic meniscal injury. Consider inflammatory component such as gout.   - Physical Therapy Referral; Future   Open to this as of today  - Encouraged to schedule tylenol / Voltaren up to qid   - NSAID available as PRN but not to be used overly frequently    Emphasized to hydrate and eat with food   - If no better with above changes, likely XR (with uric acid?) and consider injection      Type 2 diabetes mellitus with other specified complication, without long-term current use of insulin (H)  - Agree with PharmD recommendations from today, appreciate   - Adult Eye  Referral; Future    Jewell Quintanilla DO  [facility]    Precepted with Dr. Heath    Discussed with PharmD, mary jo       Subjective   Moustapha is a 67 year old  M hx T2DM, chronic knee pain presenting for the following health issues:    Knee Pain (L Knee pain. 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 Considered chronic meniscal injury. Gout?  Prescribed meds as below     Since last visit:   Pain overall: overall the same -- a little better mainly with cream. Pills help a little bit.    He is now working 7 days a week often. Doesn't get a break.   Function:    About the same    No better no worse    Just has pain after being on his feet all day     Meds help?   Tylenol - minimal help. Takes once in the morning.     Ibuprofen - minimal help. Takes once in the morning.      Voltaren -- using. Applies once in the morning. Helps!     He does do some exercises on his  off days   Thoughts on     XR - can't do today                PT - open to this                Uric acid vs other labs? Not today      Review of Systems   Constitutional, HEENT, cardiovascular, pulmonary, GI, , musculoskeletal, neuro, skin, endocrine and psych systems are negative, except as otherwise noted.      Objective    /66   Pulse 94   Wt 64.9 kg (143 lb 1.3 oz)   SpO2 98%   BMI 25.35 kg/m    Body mass index is 25.35 kg/m .  Physical Exam     Gen: Pleasant. No distress.    HEENT: MMM.   Neck: No overt asymmetry.   CV: Appears well-perfused.   Resp: Breathing comfortably on room air.    Abd: Non-distended, non-tender.   Ext: No edema or overt asymmetry/deformity.   Skin: No overt rash on easily visualized skin.   Neuro: Non-focal.   Psych: Calm.     See MSK exam from last visit -- identical today

## 2022-11-28 ENCOUNTER — OFFICE VISIT (OUTPATIENT)
Dept: FAMILY MEDICINE | Facility: CLINIC | Age: 67
End: 2022-11-28
Payer: COMMERCIAL

## 2022-11-28 ENCOUNTER — OFFICE VISIT (OUTPATIENT)
Dept: PHARMACY | Facility: CLINIC | Age: 67
End: 2022-11-28
Payer: COMMERCIAL

## 2022-11-28 VITALS
OXYGEN SATURATION: 98 % | HEART RATE: 94 BPM | SYSTOLIC BLOOD PRESSURE: 112 MMHG | WEIGHT: 143.08 LBS | BODY MASS INDEX: 25.35 KG/M2 | DIASTOLIC BLOOD PRESSURE: 66 MMHG

## 2022-11-28 DIAGNOSIS — E11.9 TYPE 2 DIABETES MELLITUS WITHOUT COMPLICATION, WITHOUT LONG-TERM CURRENT USE OF INSULIN (H): Primary | ICD-10-CM

## 2022-11-28 DIAGNOSIS — E11.69 TYPE 2 DIABETES MELLITUS WITH OTHER SPECIFIED COMPLICATION, WITHOUT LONG-TERM CURRENT USE OF INSULIN (H): Primary | ICD-10-CM

## 2022-11-28 DIAGNOSIS — G89.29 CHRONIC PAIN OF LEFT KNEE: ICD-10-CM

## 2022-11-28 DIAGNOSIS — M25.562 CHRONIC PAIN OF LEFT KNEE: ICD-10-CM

## 2022-11-28 PROCEDURE — 99213 OFFICE O/P EST LOW 20 MIN: CPT | Mod: GC | Performed by: STUDENT IN AN ORGANIZED HEALTH CARE EDUCATION/TRAINING PROGRAM

## 2022-11-28 PROCEDURE — 99207 PR NO CHARGE LOS: CPT | Performed by: PHARMACIST

## 2022-11-28 RX ORDER — SEMAGLUTIDE 1.34 MG/ML
1 INJECTION, SOLUTION SUBCUTANEOUS WEEKLY
Qty: 9 ML | Refills: 3 | Status: SHIPPED | OUTPATIENT
Start: 2022-11-28 | End: 2023-03-15

## 2022-11-28 NOTE — PROGRESS NOTES
"ASSESSMENT:                            DM: A1c above goal <7%, however fasting blood glucose have improved! Still room to decrease to get to goal <130. Tolerating without concern. Continued confusion on how patient taking oral medicines, reports differently today than at previous visit. Next visit will work to get patient to bring oral medicines to clinic to clarify. Especially as would then only be taking 1/2 of maximum dose of Metformin.     PLAN:                            Increase Ozempic to 1 mg/week    See Patient Instructions for co-developed, patient-stated behavior change goals.     Follow-up: Winter exercise routine, check in on previous dietary changes    SUBJECTIVE/OBJECTIVE:                          Moustapha Weaver is a 67 year old male coming in for a follow-up visit. Today's visit is a co-visit with Dr. Quintanilla. Today's visit is a follow-up diabetes education visit from 10/24/2022.     DM: No concerns for weekly injection. 0.5 mg weekly Ozempic. Denies nausea/vomiting/diarrhea. Performs the injection on Tuesdays. Does many other tasks on Tuesdays, take out garbage, this helps him remember. No concerns for nausea/vomiting/diarrhea. Performs self-monitor blood glucose once per day every day. Before 250s and higher, now 160-170. Same oral medicines, Glipizide, Metformin, Atorvastatin Lisinopril. 1 of each at end of the day. Once per day only for all medicines.     Lab Results   Component Value Date    A1C 13.1 09/14/2022    A1C 10.7 01/12/2012    A1C Sent to [facility] 02/21/2011       Today's Vitals:   BP Readings from Last 1 Encounters:   11/28/22 112/66     Pulse Readings from Last 1 Encounters:   11/28/22 94     Wt Readings from Last 1 Encounters:   11/28/22 143 lb 1.3 oz (64.9 kg)     Ht Readings from Last 1 Encounters:   10/24/22 5' 2.99\" (1.6 m)     Estimated body mass index is 25.35 kg/m  as calculated from the following:    Height as of 10/24/22: 5' 2.99\" (1.6 m).    Weight as of " an earlier encounter on 11/28/22: 143 lb 1.3 oz (64.9 kg).    Temp Readings from Last 1 Encounters:   10/24/22 98.5  F (36.9  C) (Oral)       ----------------    I spent 30 minutes with this patient today. Any diabetes medication dose changes were made via the CDE Protocol and Collaborative Practice Agreement. A copy of the visit note was provided to the patient's primary care provider.    The patient was given a summary of these recommendations. See Provider note/AVS from today.     Fani Rosenthal, Pharm.D., [facility]  Phone: [phone]  December 8, 2022 at 10:21 AM

## 2023-01-10 DIAGNOSIS — H69.92 DYSFUNCTION OF LEFT EUSTACHIAN TUBE: Primary | ICD-10-CM

## 2023-01-10 RX ORDER — FLUTICASONE PROPIONATE 50 MCG
1 SPRAY, SUSPENSION (ML) NASAL DAILY
Qty: 11.1 ML | Refills: 1 | Status: SHIPPED | OUTPATIENT
Start: 2023-01-10 | End: 2023-03-15

## 2023-03-15 ENCOUNTER — OFFICE VISIT (OUTPATIENT)
Dept: FAMILY MEDICINE | Facility: CLINIC | Age: 68
End: 2023-03-15
Payer: COMMERCIAL

## 2023-03-15 ENCOUNTER — ALLIED HEALTH/NURSE VISIT (OUTPATIENT)
Dept: EDUCATION SERVICES | Facility: CLINIC | Age: 68
End: 2023-03-15
Payer: COMMERCIAL

## 2023-03-15 VITALS
SYSTOLIC BLOOD PRESSURE: 128 MMHG | HEART RATE: 92 BPM | WEIGHT: 140.12 LBS | OXYGEN SATURATION: 97 % | BODY MASS INDEX: 24.83 KG/M2 | DIASTOLIC BLOOD PRESSURE: 74 MMHG

## 2023-03-15 DIAGNOSIS — G89.29 CHRONIC PAIN OF LEFT KNEE: ICD-10-CM

## 2023-03-15 DIAGNOSIS — Z59.9 FINANCIAL DIFFICULTIES: ICD-10-CM

## 2023-03-15 DIAGNOSIS — I10 BENIGN ESSENTIAL HYPERTENSION: ICD-10-CM

## 2023-03-15 DIAGNOSIS — E11.69 TYPE 2 DIABETES MELLITUS WITH OTHER SPECIFIED COMPLICATION, WITHOUT LONG-TERM CURRENT USE OF INSULIN (H): Primary | ICD-10-CM

## 2023-03-15 DIAGNOSIS — E11.65 TYPE 2 DIABETES MELLITUS WITH HYPERGLYCEMIA, WITHOUT LONG-TERM CURRENT USE OF INSULIN (H): Primary | ICD-10-CM

## 2023-03-15 DIAGNOSIS — H69.92 DYSFUNCTION OF LEFT EUSTACHIAN TUBE: ICD-10-CM

## 2023-03-15 DIAGNOSIS — M25.562 CHRONIC PAIN OF LEFT KNEE: ICD-10-CM

## 2023-03-15 LAB — HBA1C MFR BLD: 10.9 % (ref 0–5.6)

## 2023-03-15 PROCEDURE — G0108 DIAB MANAGE TRN  PER INDIV: HCPCS | Performed by: PHARMACIST

## 2023-03-15 PROCEDURE — 36415 COLL VENOUS BLD VENIPUNCTURE: CPT | Performed by: STUDENT IN AN ORGANIZED HEALTH CARE EDUCATION/TRAINING PROGRAM

## 2023-03-15 PROCEDURE — 99207 PR DROP WITH A PROCEDURE: CPT | Performed by: PHARMACIST

## 2023-03-15 PROCEDURE — 83036 HEMOGLOBIN GLYCOSYLATED A1C: CPT | Performed by: STUDENT IN AN ORGANIZED HEALTH CARE EDUCATION/TRAINING PROGRAM

## 2023-03-15 PROCEDURE — 99214 OFFICE O/P EST MOD 30 MIN: CPT | Mod: GC | Performed by: STUDENT IN AN ORGANIZED HEALTH CARE EDUCATION/TRAINING PROGRAM

## 2023-03-15 RX ORDER — SEMAGLUTIDE 1.34 MG/ML
0.25 INJECTION, SOLUTION SUBCUTANEOUS WEEKLY
Qty: 1.5 ML | Refills: 1 | Status: SHIPPED | OUTPATIENT
Start: 2023-03-15 | End: 2023-05-17

## 2023-03-15 RX ORDER — FLUTICASONE PROPIONATE 50 MCG
1 SPRAY, SUSPENSION (ML) NASAL DAILY
Qty: 11.1 ML | Refills: 1 | Status: SHIPPED | OUTPATIENT
Start: 2023-03-15 | End: 2023-05-17

## 2023-03-15 RX ORDER — ATORVASTATIN CALCIUM 40 MG/1
40 TABLET, FILM COATED ORAL DAILY
Qty: 90 TABLET | Refills: 3 | Status: SHIPPED | OUTPATIENT
Start: 2023-03-15 | End: 2023-05-17

## 2023-03-15 RX ORDER — GLIPIZIDE 10 MG/1
10 TABLET, FILM COATED, EXTENDED RELEASE ORAL DAILY
Qty: 90 TABLET | Refills: 1 | Status: SHIPPED | OUTPATIENT
Start: 2023-03-15 | End: 2023-05-17

## 2023-03-15 RX ORDER — IBUPROFEN 800 MG/1
800 TABLET, FILM COATED ORAL EVERY 8 HOURS PRN
Qty: 30 TABLET | Refills: 1 | Status: SHIPPED | OUTPATIENT
Start: 2023-03-15 | End: 2023-05-17

## 2023-03-15 SDOH — ECONOMIC STABILITY - INCOME SECURITY: PROBLEM RELATED TO HOUSING AND ECONOMIC CIRCUMSTANCES, UNSPECIFIED: Z59.9

## 2023-03-15 NOTE — PROGRESS NOTES
Diabetes Self-Management Education & Support    Presents for: Follow-up    Type of Visit: In Person, hiramisit with Dr. Quintanilla, patient's primary care provider     How would patient like to obtain AVS? Printed copy    ASSESSMENT:    Lack of access to medications (insurance) primary reason identifiable today for A1c above goal <7%. Historically tolerated medicines without concerns. Given restarted GLP1 after month lapse, needs retitration to prevent adverse drug reaction.     Patient's most recent   Lab Results   Component Value Date    A1C 10.9 03/15/2023    A1C 10.7 01/12/2012    is not meeting goal of <7.0    Diabetes knowledge and skills assessment:   Patient is knowledgeable in diabetes management concepts related to: Monitoring, Taking Medication and Reducing Risks    Continue education with the following diabetes management concepts: Healthy Eating, Being Active and Healthy Coping    Based on learning assessment above, most appropriate setting for further diabetes education would be: Individual setting.    INTERVENTIONS:    Education provided today on:  AADE Self-Care Behaviors:  There are no care plans that you recently modified to display for this patient.      Opportunities for ongoing education and support in diabetes-self management were discussed. Pt verbalized understanding of concepts discussed and recommendations provided today.       Education Materials Provided:  No new materials provided today          PLAN    Reviewed plan to restart Glipizide, Atorvastatin and Ozempic  Reviewed titration schedule - 0.25 mg/week x4 weeks, then increase to 0.5 mg/week  Instructed patient to change point-of-care blood glucose monitoring to premeal - plans to check before AM meal ~10am (starts work at 4 am)    Topics to cover at upcoming visits: Healthy Eating, Being Active and Taking Medication  Follow-up: 2 weeks w/ primary care provider, 6 weeks w/ CDE to determine if further titration of Ozmepic warranted    See  "Goals Section for co-developed, patient-stated behavior change goals.  AVS provided to patient today.          SUBJECTIVE / OBJECTIVE:  Presents for: Follow-up  Accompanied by: Self  Diabetes education in the past 24mo: Yes  Focus of Visit: Taking Medication, Monitoring  Diabetes type: Type 2  Disease course: Worsening  Difficulty affording diabetes medication?: Sometimes  Difficulty affording diabetes testing supplies?: Sometimes  Other concerns:: Language barrier - seen today with in person   Cultural Influences/Ethnic Background:   or     Diabetes Symptoms & Complications:     Complications assessed today?: No    Patient Problem List and Family Medical History reviewed for relevant medical history, current medical status, and diabetes risk factors.    Vitals:  There were no vitals taken for this visit.  Estimated body mass index is 24.83 kg/m  as calculated from the following:    Height as of 10/24/22: 1.6 m (5' 2.99\").    Weight as of an earlier encounter on 3/15/23: 63.6 kg (140 lb 1.9 oz).   Last 3 BP:   BP Readings from Last 3 Encounters:   03/15/23 128/74   11/28/22 112/66   10/24/22 125/83       History   Smoking Status     Never   Smokeless Tobacco     Never       Labs:  Lab Results   Component Value Date    A1C 10.9 03/15/2023    A1C 10.7 01/12/2012     Lab Results   Component Value Date     09/26/2022    .0 03/04/2011     Lab Results   Component Value Date    LDL 98 09/14/2022     Direct Measure HDL   Date Value Ref Range Status   09/14/2022 45 >=40 mg/dL Final   ]  GFR Estimate   Date Value Ref Range Status   09/26/2022 >90 >60 mL/min/1.73m2 Final     Comment:     Effective December 21, 2021 eGFRcr in adults is calculated using the 2021 CKD-EPI creatinine equation which includes age and gender (Andres rodriguez al., NEJM, DOI: 10.1056/TTIIxh4394697)     No results found for: GFRESTBLACK  Lab Results   Component Value Date    CR 0.50 09/26/2022    CR 0.8 03/04/2011 "     No results found for: MICROALBUMIN    Healthy Eating:  Healthy Eating Assessed Today: No    Being Active:  Being Active Assessed Today: No    Monitoring:  Monitoring Assessed Today: Yes  Did patient bring glucose meter to appointment? : No  Times checking blood sugar at home (number): 1  Times checking blood sugar at home (per): Day  Blood glucose trend: No change    Taking Medications:  Diabetes Medication(s)     Biguanides       metFORMIN (GLUCOPHAGE) 1000 MG tablet    Take 1 tablet (1,000 mg) by mouth 2 times daily (with meals)    Sulfonylureas       glipiZIDE (GLIPIZIDE XL) 10 MG 24 hr tablet    Take 1 tablet (10 mg) by mouth daily    Incretin Mimetic Agents       semaglutide (OZEMPIC, 0.25 OR 0.5 MG/DOSE,) 2 MG/1.5ML SOPN pen    Inject 0.25 mg Subcutaneous once a week , after one month increase to 0.5 mg weekly          Taking Medication Assessed Today: Yes (Had a lapse in insurance, so has been without most medicines for several months. Continues only on Metformin BID. When had insurance, no concerns for cost/copays of medicines.)  Current Treatments: Oral Medication (taken by mouth), Non-insulin Injectables  Problems taking diabetes medications regularly?: No  Diabetes medication side effects?: No    Problem Solving:  Problem Solving Assessed Today: No    Reducing Risks:  Reducing Risks Assessed Today: Yes  CAD Risks: Diabetes Mellitus, Male sex (Agreeable today to restart Atorvastatin)    Healthy Coping:  Healthy Coping Assessed Today: No  Patient Activation Measure Survey Score:  No flowsheet data found.          Fani Rosenthal, Pharm.D., [facility]  Phone: [phone]  March 15, 2023 at 3:52 PM      Time Spent: 30 minutes  Encounter Type: Individual        Any diabetes medication dose changes were made via the Certified Diabetes Care & Education Protocol in collaboration with the patient's referring provider and primary care provider. A copy of this  encounter was shared with the provider.

## 2023-03-15 NOTE — PROGRESS NOTES
Preceptor Attestation:   Patient seen, evaluated and discussed with the resident. I have verified the content of the note, which accurately reflects my assessment of the patient and the plan of care.  Supervising Physician:Andreas Concepcion MD  [facility]

## 2023-03-15 NOTE — PROGRESS NOTES
Assessment & Plan     Type 2 diabetes mellitus with other specified complication, without long-term current use of insulin (H)  A1c 10.9 on metformin monotherapy; discussed in visit today. still above goal <7 but better than we would have expected given off glipizide / semaglutide x months. Agreeable with plan to get back on ALTAMIRANO/GLP1 and reassess in 3 months.   - Hemoglobin A1c  - Re-emphasized dietary recommendations   - Refills of all meds requested below, provided -- appreciate PharmD assistance   - Refill: semaglutide (OZEMPIC, 0.25 OR 0.5 MG/DOSE,) 2 MG/1.5ML SOPN pen; Inject 0.25 mg Subcutaneous once a week , after one month increase to 0.5 mg weekly  Dispense: 1.5 mL; Refill: 1   Restart as per instructions per PharmD, appreciate   - Refill: glipiZIDE (GLIPIZIDE XL) 10 MG 24 hr tablet; Take 1 tablet (10 mg) by mouth daily  Dispense: 90 tablet; Refill: 1  - Refill: atorvastatin (LIPITOR) 40 MG tablet; Take 1 tablet (40 mg) by mouth daily  Dispense: 90 tablet; Refill: 3  - Refill: metFORMIN (GLUCOPHAGE) 1000 MG tablet; Take 1 tablet (1,000 mg) by mouth 2 times daily (with meals)  Dispense: 180 tablet; Refill: 3  - blood glucose (NO BRAND SPECIFIED) test strip; Use to test blood sugar 1 times daily  Dispense: 100 strip; Refill: 3  - blood glucose (NO BRAND SPECIFIED) lancets standard; Use to test blood sugar 1 times daily  Dispense: 100 each; Refill: 3  - F/u:   1 month to reassess adherence / barriers    3 months for A1c recheck       Benign essential hypertension  At goal today off of amlodipine. Wishes to continue off med for now. No proteinuria to suggest need for ACE/ARB.   -Took amlodipine off med list today   -Continue monitoring  -Regarding ASA    Per most recent USPSTF, does not appear he should start aspirin given age >61yo                However, ASCVD risk is very high (>20)               Per discussion with him he would like to hold off for now, can continue to reassess   -Continue statin at  "present dose     Dysfunction of left eustachian tube  Controlled with flonase. Requesting refill.   - fluticasone (FLONASE) 50 MCG/ACT nasal spray; Spray 1 spray into both nostrils daily  Dispense: 11.1 mL; Refill: 1    Chronic pain of left knee  Controlled on current meds PRN. Requesting refills.   - ibuprofen (ADVIL/MOTRIN) 800 MG tablet; Take 1 tablet (800 mg) by mouth every 8 hours as needed for moderate pain (4-6)  Dispense: 30 tablet; Refill: 1  - diclofenac (VOLTAREN) 1 % topical gel; Apply 4 g topically 4 times daily as needed for moderate pain (4-6)  Dispense: 100 g; Refill: 1    Financial difficulties  Had lost insurance x months. Now back on.   -Support him keeping insurance   -Declined meeting with ANA ROSA but he will notify if needing      Jewell Quintanilla,   [facility]    Precepted with Dr. Concepcion   Discussed in detail with PharmD, appreciate!         Bruno Gerard is a 67 year old M hx T2DM presenting for the following health issues:  Diabetes (Concerns about DM sometimes 180/ 200. Sometimes would not take insulin due to insurance being cut off. Insurance back to active)    Greenlandic interpretor present     HPI     T2DM Follow Up:   Last A1c 13.1 (goal <7)   Any home data?               180-200 -- end of day    Does not check fasting               (Before, fasting had been 160-170; before that, had been 250s and higher)  Diet:               no changes from before               Typically says \"not good\"               Mostly beans, rice, chicken              Few veggies               No added sugar nor pop    Exercise: he walks around and lifts things with work   Other factors (work, stress, financial, substances, etc): ran out of his insurance, back on now   Any sx (visual, numbness, troubles with urination, skin infections, chest pain, etc): no   Meds (did he bring them today? no):              Metformin 2000 mg daily  (big tablet)    Adherent? Yes     Side effects? " None. No GI.                Glipizide 10 mg daily     Adherent? No     Just didn't fill it                Semaglutide -1 mg injection per week       Adherent? No     Very expensive -- around $400    He is taking omega 3     On HI statin (diabetes age >40 and ASCVD is 27.6% -- last LDL 98 in 9/2022)   Blood pressure controlled -- he is not taking his blood pressure meds   He is a never smoker   Aspirin               Per most recent USPSTF, does not appear he should start aspirin given age >59yo                However, ASCVD risk is very high               Per discussion with him he would like to hold off     Other med needs   His ear is congested again    Wants a refill on his flonase   His knee is still painful from time to time    Controlled with Voltaren gel and PRN ibuprofen    Wants refills on those     Review of Systems   Constitutional, HEENT, cardiovascular, pulmonary, GI, , musculoskeletal, neuro, skin, endocrine and psych systems are negative, except as otherwise noted.      Objective    /74   Pulse 92   Wt 63.6 kg (140 lb 1.9 oz)   SpO2 97%   BMI 24.83 kg/m    Body mass index is 24.83 kg/m .  Physical Exam     Gen: Pleasant. No distress.    HEENT: MMM.   Neck: No overt asymmetry.   CV: Appears well-perfused. RRR. No murmur.   Resp: Breathing comfortably on room air. Lungs clear to auscultation bilaterally without wheeze or crackle.   Abd: Non-distended   Ext: No edema or overt asymmetry/deformity.   Skin: No overt rash on easily visualized skin.   Neuro: Non-focal.   Psych: Calm.

## 2023-03-16 ENCOUNTER — TELEPHONE (OUTPATIENT)
Dept: FAMILY MEDICINE | Facility: CLINIC | Age: 68
End: 2023-03-16
Payer: COMMERCIAL

## 2023-03-16 NOTE — TELEPHONE ENCOUNTER
[facility] phone call message- medication clarification/question:    Full Medication Name: semaglutide (OZEMPIC, 0.25 OR 0.5 MG/DOSE,) 2 MG/1.5ML SOPN pen    Question: Patient called stating cannot  medication from pharmacy, would like to know why and if this is needing prior auth, Please call and advise patient on what to do for the time being, he stated this is important and the whole point he came in for an appointment yesterday was to discuss the insulin and get refill. Please advise and look into this thank you.    Pharmacy confirmed as [id number] - [place], [place] - [address] AT [facility]: Yes    OK to leave a message on voice mail? Yes    Primary language: Macedonian      needed? Yes    Call taken on March 16, 2023 at 11:28 AM by Candelario Deutsch

## 2023-03-18 PROBLEM — H69.92 DYSFUNCTION OF LEFT EUSTACHIAN TUBE: Status: ACTIVE | Noted: 2023-03-18

## 2023-03-18 PROBLEM — E11.69 TYPE 2 DIABETES MELLITUS WITH OTHER SPECIFIED COMPLICATION, WITHOUT LONG-TERM CURRENT USE OF INSULIN (H): Status: ACTIVE | Noted: 2022-09-18

## 2023-03-18 PROBLEM — G89.29 CHRONIC PAIN OF LEFT KNEE: Status: ACTIVE | Noted: 2023-03-18

## 2023-03-18 PROBLEM — M25.562 CHRONIC PAIN OF LEFT KNEE: Status: ACTIVE | Noted: 2023-03-18

## 2023-03-18 PROBLEM — Z59.9 FINANCIAL DIFFICULTIES: Status: ACTIVE | Noted: 2023-03-18

## 2023-03-20 PROBLEM — I10 BENIGN ESSENTIAL HYPERTENSION: Status: ACTIVE | Noted: 2023-03-20

## 2023-03-30 NOTE — TELEPHONE ENCOUNTER
Call to pharmacy. Confirm that patient picked up medicine 3/28/2023, was covered by insurance. Patient out of pocket cost $94.    Will route to team to see if pt able to reschedule appt from yesterday to review tolerability/titration.     Fani Rosenthal, Pharm.D., [facility]  Phone: [phone]  March 30, 2023 at 2:59 PM

## 2023-04-05 ENCOUNTER — PATIENT OUTREACH (OUTPATIENT)
Dept: FAMILY MEDICINE | Facility: CLINIC | Age: 68
End: 2023-04-05
Payer: COMMERCIAL

## 2023-04-05 NOTE — TELEPHONE ENCOUNTER
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 I remember him stating that he could only do AMs due to work schedule.   I LVMTCB if appt didn't work tomorrow. I will check if Fani has any openings :)

## 2023-04-16 NOTE — PROGRESS NOTES
"  Assessment & Plan     Type 2 diabetes mellitus with other specified complication, without long-term current use of insulin (H)  Remains uncontrolled given hemoglobin A1c of 10.9% 1 months prior, but patient does report significant symptomatic improvement after restarting his Ozempic.  Denies side effects since starting GLP-1 agonist, so we will double dose to 0.5 mg after today's appointment.  He also reports only taking metformin once daily, so discussed the benefit of taking this both in the morning and at night.  - increase Ozempic from 0.25 mg to 0.5 mg weekly  - reiterated twice daily dosing of metformin 1000 mg   - continue glipizide 10 mg daily  - okay to continue checking BG once daily after work, may consider decreasing/increasing in the future if a1c drops below 10% or more data is needed  - continue atorvastatin 40 mg daily  - F/u 1 month to ensure ongoing adherence / tolerated GLP1 increase   - Follow up in 2 months for next a1c check     Lapse in insurance  The patient reports he has maintained a stable job at [employer] for the past 3 years and his lapse in insurance was due to him forgetting to reenroll.  However, he is confused about what insurance he is on. He had has a Medicare card and a supplementary insurance card in his wallet.  -Social work referral for insurance education/optimization    Future Consider discussing colon cancer screening and zoster vaccination at next appointment.    BMI:   Estimated body mass index is 25.06 kg/m  as calculated from the following:    Height as of this encounter: 1.575 m (5' 2\").    Weight as of this encounter: 62.1 kg (137 lb).     Jeet De Jesus, MS3     I was present with the medical student who participated in the service and in the documentation of the note. I have verified the history and personally performed the physical exam and medical decision making, and have verified the content of the note, which accurately reflects my assessment of the patient and the " "plan of care    Ruy Quintanilla DO  [facility] Resident   Pager #: [phone]    Precepted with Dr. Henrique Carr   Moustapha is a 67 year old M hx T2DM presenting for the following health issues:  Diabetes (Pt states got medication, taking injection med works well. 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      Patient reports that he has been feeling much better since restarting the Ozempic, with decreased fatigue and decreased polyuria/dipsia.  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  It has been between 100-180.   He has been taking his Ozempic injections once weekly without significant side effects of abdominal pain, diarrhea, or early satiety.  He reports taking his glipizide and atorvastatin once daily, but also reports taking his metformin only once daily due to a misunderstanding in dosing frequency.     The patient reports that he did not lose his job but rather forgot to reapply for insurance at the turn of the year.  Now that he has insurance again his co-pay for Ozempic is about $90 for 1-1/2-month supply which is manageable for him.     Review of Systems   Constitutional, HEENT, cardiovascular, pulmonary, gi and gu systems are negative, except as otherwise noted.      Objective    /70 (BP Location: Right arm, Patient Position: Sitting, Cuff Size: Adult Regular)   Pulse 89   Temp 97.6  F (36.4  C) (Tympanic)   Resp 16   Ht 1.575 m (5' 2\")   Wt 62.1 kg (137 lb)   SpO2 90%   BMI 25.06 kg/m    Body mass index is 25.06 kg/m .  Physical Exam     Gen: Pleasant. No distress.    HEENT: MMM.   Neck: No overt asymmetry.   CV: Appears well-perfused.   Resp: Breathing comfortably on room air.   Abd: Non-distended  Ext: No edema or overt asymmetry/deformity. "   Skin: No overt rash on easily visualized skin.   Neuro: Non-focal.   Psych: Calm.

## 2023-04-17 ENCOUNTER — OFFICE VISIT (OUTPATIENT)
Dept: FAMILY MEDICINE | Facility: CLINIC | Age: 68
End: 2023-04-17
Payer: COMMERCIAL

## 2023-04-17 VITALS
SYSTOLIC BLOOD PRESSURE: 126 MMHG | DIASTOLIC BLOOD PRESSURE: 70 MMHG | TEMPERATURE: 97.6 F | HEART RATE: 89 BPM | RESPIRATION RATE: 16 BRPM | HEIGHT: 62 IN | OXYGEN SATURATION: 90 % | WEIGHT: 137 LBS | BODY MASS INDEX: 25.21 KG/M2

## 2023-04-17 DIAGNOSIS — E11.69 TYPE 2 DIABETES MELLITUS WITH OTHER SPECIFIED COMPLICATION, WITHOUT LONG-TERM CURRENT USE OF INSULIN (H): ICD-10-CM

## 2023-04-17 DIAGNOSIS — Z59.9 FINANCIAL DIFFICULTIES: ICD-10-CM

## 2023-04-17 DIAGNOSIS — Z12.11 SCREEN FOR COLON CANCER: Primary | ICD-10-CM

## 2023-04-17 PROCEDURE — 99214 OFFICE O/P EST MOD 30 MIN: CPT | Mod: GC | Performed by: STUDENT IN AN ORGANIZED HEALTH CARE EDUCATION/TRAINING PROGRAM

## 2023-04-17 RX ORDER — LANCETS
EACH MISCELLANEOUS
COMMUNITY
Start: 2023-03-16 | End: 2023-05-17

## 2023-04-17 SDOH — ECONOMIC STABILITY - INCOME SECURITY: PROBLEM RELATED TO HOUSING AND ECONOMIC CIRCUMSTANCES, UNSPECIFIED: Z59.9

## 2023-05-17 ENCOUNTER — OFFICE VISIT (OUTPATIENT)
Dept: FAMILY MEDICINE | Facility: CLINIC | Age: 68
End: 2023-05-17
Payer: COMMERCIAL

## 2023-05-17 DIAGNOSIS — E11.69 TYPE 2 DIABETES MELLITUS WITH OTHER SPECIFIED COMPLICATION, WITHOUT LONG-TERM CURRENT USE OF INSULIN (H): ICD-10-CM

## 2023-05-17 DIAGNOSIS — M25.562 CHRONIC PAIN OF LEFT KNEE: ICD-10-CM

## 2023-05-17 DIAGNOSIS — H69.92 DYSFUNCTION OF LEFT EUSTACHIAN TUBE: ICD-10-CM

## 2023-05-17 DIAGNOSIS — G89.29 CHRONIC PAIN OF LEFT KNEE: ICD-10-CM

## 2023-05-17 PROCEDURE — 99214 OFFICE O/P EST MOD 30 MIN: CPT | Mod: GC | Performed by: STUDENT IN AN ORGANIZED HEALTH CARE EDUCATION/TRAINING PROGRAM

## 2023-05-17 RX ORDER — FLUTICASONE PROPIONATE 50 MCG
1 SPRAY, SUSPENSION (ML) NASAL DAILY
Qty: 11.1 ML | Refills: 1 | Status: SHIPPED | OUTPATIENT
Start: 2023-05-17 | End: 2023-11-08 | Stop reason: ALTCHOICE

## 2023-05-17 RX ORDER — ACETAMINOPHEN 500 MG
500-1000 TABLET ORAL EVERY 6 HOURS PRN
Qty: 30 TABLET | Refills: 3 | Status: SHIPPED | OUTPATIENT
Start: 2023-05-17 | End: 2023-11-08

## 2023-05-17 RX ORDER — LANCETS
100 EACH MISCELLANEOUS
Qty: 100 EACH | Refills: 3 | Status: SHIPPED | OUTPATIENT
Start: 2023-05-17 | End: 2024-01-25

## 2023-05-17 RX ORDER — GLIPIZIDE 10 MG/1
10 TABLET, FILM COATED, EXTENDED RELEASE ORAL DAILY
Qty: 90 TABLET | Refills: 1 | Status: SHIPPED | OUTPATIENT
Start: 2023-05-17 | End: 2023-08-10 | Stop reason: ALTCHOICE

## 2023-05-17 RX ORDER — SEMAGLUTIDE 1.34 MG/ML
0.25 INJECTION, SOLUTION SUBCUTANEOUS WEEKLY
Qty: 1.5 ML | Refills: 1 | Status: SHIPPED | OUTPATIENT
Start: 2023-05-17 | End: 2023-08-10 | Stop reason: ALTCHOICE

## 2023-05-17 RX ORDER — ATORVASTATIN CALCIUM 40 MG/1
40 TABLET, FILM COATED ORAL DAILY
Qty: 90 TABLET | Refills: 3 | Status: SHIPPED | OUTPATIENT
Start: 2023-05-17 | End: 2023-11-08

## 2023-05-17 RX ORDER — IBUPROFEN 800 MG/1
800 TABLET, FILM COATED ORAL EVERY 8 HOURS PRN
Qty: 30 TABLET | Refills: 1 | Status: SHIPPED | OUTPATIENT
Start: 2023-05-17 | End: 2023-11-08

## 2023-05-17 NOTE — PROGRESS NOTES
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 No home data. Reports adherence to his meds. Not interested in adding others or changing doses. Does not describe any sx of complications of uncontrolled diabetes.   - Encouraged him to obtain home data given A1c level  - Given risk of further worsening diabetes off meds, will refill as requested   - Encouraged considering diet adjustment when he is ready   - atorvastatin (LIPITOR) 40 MG tablet; Take 1 tablet (40 mg) by mouth daily  Dispense: 90 tablet; Refill: 3  - blood glucose (NO BRAND SPECIFIED) lancets standard; Use to test blood sugar 1 times daily  Dispense: 100 each; Refill: 3  - blood glucose (NO BRAND SPECIFIED) test strip; Use to test blood sugar 1 times daily  Dispense: 100 strip; Refill: 3  - blood glucose monitoring (SOFTCLIX) lancets; 100 each 4 times daily (before meals and nightly) Use to test blood sugar 4 times daily or as directed.  Dispense: 100 each; Refill: 3  - glipiZIDE (GLIPIZIDE XL) 10 MG 24 hr tablet; Take 1 tablet (10 mg) by mouth daily  Dispense: 90 tablet; Refill: 1  - metFORMIN (GLUCOPHAGE) 1000 MG tablet; Take 1 tablet (1,000 mg) by mouth 2 times daily (with meals)  Dispense: 180 tablet; Refill: 3  - semaglutide (OZEMPIC, 0.25 OR 0.5 MG/DOSE,) 2 MG/1.5ML SOPN pen; Inject 0.25 mg Subcutaneous once a week , after one month increase to 0.5 mg weekly  Dispense: 1.5 mL; Refill: 1    Chronic pain of left knee  Stable. Requests refills.   - acetaminophen (TYLENOL) 500 MG tablet; Take 1-2 tablets (500-1,000 mg) by mouth every 6 hours as needed for mild pain  Dispense: 30 tablet;  Refill: 3  - diclofenac (VOLTAREN) 1 % topical gel; Apply 4 g topically 4 times daily as needed for moderate pain  Dispense: 100 g; Refill: 1  - ibuprofen (ADVIL/MOTRIN) 800 MG tablet; Take 1 tablet (800 mg) by mouth every 8 hours as needed for moderate pain  Dispense: 30 tablet; Refill: 1    Dysfunction of left eustachian tube  Stable. Requests refills.   - fluticasone (FLONASE) 50 MCG/ACT nasal spray; Spray 1 spray into both nostrils daily  Dispense: 11.1 mL; Refill: 1     DO KELSEA Acevedo [facility]    Precepted with Dr. 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 Hasn't had supplies at home.   Diet: same as before. Still eating a fair amount of carbs.   Meds:    Metformin 2000 mg daily     Adherent? Yes but ran out after last visit and hasn't refilled since then     Side effects? No     Semaglutide       Discussed increasing from 0.25 to 0.5 mg weekly last visit 1 month ago     Adherent? Yes but ran out after last visit and hasn't refilled since then      Side effects? No  Any chest pain, leg numbness, wounds, etc     He requests med refills on all his other other meds   His pain is stable, no change  No new hip or ankle pain. It is just his knee. Hasn't started with PT    Does get congested every so often. Better with the nose spray.     Review of Systems   Constitutional, HEENT, cardiovascular, pulmonary, GI, , musculoskeletal, neuro, skin, endocrine and psych systems are negative, except as otherwise noted.      Objective    There were no vitals taken for this visit.  There is no height  or weight on file to calculate BMI.     He declines vital signs.   He wants his meds and will not do vitals per his report     Physical Exam     Gen: No distress.    HEENT: MMM.   Neck: No overt asymmetry. No JVD   CV: Appears well-perfused.   Resp: Breathing comfortably on room air.   Abd: Non-distended  Ext: No edema or overt asymmetry/deformity.   Skin: no foot wounds, sensation intact. No overt rash on easily visualized skin.   Neuro: Non-focal.   Psych: irritable mood

## 2023-06-06 NOTE — PROGRESS NOTES
Preceptor Attestation:   Patient seen, evaluated and discussed with the resident. I have verified the content of the note, which accurately reflects my assessment of the patient and the plan of care.    Supervising Physician:Wes Fay MD    [facility]

## 2023-08-10 ENCOUNTER — OFFICE VISIT (OUTPATIENT)
Dept: FAMILY MEDICINE | Facility: CLINIC | Age: 68
End: 2023-08-10
Payer: COMMERCIAL

## 2023-08-10 VITALS
WEIGHT: 127 LBS | TEMPERATURE: 97.6 F | SYSTOLIC BLOOD PRESSURE: 133 MMHG | DIASTOLIC BLOOD PRESSURE: 80 MMHG | HEIGHT: 64 IN | OXYGEN SATURATION: 97 % | BODY MASS INDEX: 21.68 KG/M2 | HEART RATE: 83 BPM

## 2023-08-10 DIAGNOSIS — E11.65 TYPE 2 DIABETES MELLITUS WITH HYPERGLYCEMIA, WITH LONG-TERM CURRENT USE OF INSULIN (H): Primary | ICD-10-CM

## 2023-08-10 DIAGNOSIS — Z79.4 TYPE 2 DIABETES MELLITUS WITH HYPERGLYCEMIA, WITH LONG-TERM CURRENT USE OF INSULIN (H): Primary | ICD-10-CM

## 2023-08-10 PROCEDURE — 99214 OFFICE O/P EST MOD 30 MIN: CPT | Mod: GC

## 2023-08-10 PROCEDURE — 36415 COLL VENOUS BLD VENIPUNCTURE: CPT

## 2023-08-10 PROCEDURE — 83036 HEMOGLOBIN GLYCOSYLATED A1C: CPT

## 2023-08-10 PROCEDURE — 80048 BASIC METABOLIC PNL TOTAL CA: CPT

## 2023-08-10 NOTE — PROGRESS NOTES
Assessment & Plan   (E11.65,  Z79.4) Type 2 diabetes mellitus with hyperglycemia, with long-term current use of insulin (H)  (primary encounter diagnosis)  Comment: A1c in clinic >15 per lab, sendout pending. Given patient's concern for weight loss, reasonable to start insulin as well as change oral medication regimen at this time. Will stop glipzide, Ozempic, start Lantus 10 units nightly, Jardiance 10mg daily, continue metformin 1000mg BID. Will also send for CGM given difficulties in checking BG. At this rate, would need to check BG 4x daily in order to get viable data. Likely will need to start short acting insulin at next visit, recommend diabetes education at that time. Labs ordered, follow closely. Return to clinic in 2 weeks for a follow-up.  Plan: Basic metabolic panel, Hemoglobin A1c, insulin         glargine (LANTUS PEN) 100 UNIT/ML pen,         empagliflozin (JARDIANCE) 10 MG TABS tablet,         Continuous Blood Gluc  (FREESTYLE WYATT        2 READER) JT, Continuous Blood Gluc Sensor         (FREESTYLE WYATT 2 SENSOR) MISC, insulin pen         needle (32G X 4 MM) 32G X 4 MM miscellaneous,         blood glucose (NO BRAND SPECIFIED) test strip            Return in about 2 weeks (around 8/24/2023) for Follow up, with me.    Juhi Escalona MD  [facility]  Precepted patient with Dr. Glendy Bartlett.      Bruno Gerard is a 67 year old, presenting for the following health issues:  Diabetes (F/u)  Presenting with   in person    HPI   Current medications include   Diabetes Medication(s)       Biguanides       metFORMIN (GLUCOPHAGE) 1000 MG tablet    Take 1 tablet (1,000 mg) by mouth 2 times daily (with meals)      Insulin       insulin glargine (LANTUS PEN) 100 UNIT/ML pen    Inject 10 Units Subcutaneous At Bedtime      Sodium-Glucose Co-Transporter 2 (SGLT2) Inhibitors       empagliflozin (JARDIANCE) 10 MG TABS tablet    Take 1 tablet (10 mg) by  "mouth daily          Last A1c - 10.9   Today's BG - unknown, only checks if he isn't working  Checks BG 1-2 times per day using fingerstick, though still feeling fatigued with relation to work  Unclear if patient is actually taking medications or checking BG levels at home  Patient's main concern today is his weight loss  Since starting medications for diabetes, has felt that he has lost a lot of weight (10lb weight loss in the last 4 months per our records)  Wanting to switch medications to help prevent this  Reasonable to switch to insulin today given high A1c and apparent resistance to orals and Ozempic  No fevers, chills, shortness of breath, cough, nausea, abdominal pain, or urinary frequency (can go 3-4 hours now without needing to urinate)      Review of Systems   Constitutional, HEENT, cardiovascular, pulmonary, gi and gu systems are negative, except as otherwise noted.      Objective    /80   Pulse 83   Temp 97.6  F (36.4  C) (Oral)   Ht 1.635 m (5' 4.37\")   Wt 57.6 kg (127 lb)   SpO2 97%   BMI 21.55 kg/m    Body mass index is 21.55 kg/m .    Physical Exam   General: Alert and oriented x 3, no acute distress. Somewhat skinny.  Skin: clean, dry, and intact  HEENT: normocephalic, atraumatic, PERRL, EOMI, no conjunctival injection or icterus, ears and nose normal, no LAD or masses  Resp: clear to ausculation bilaterally, no rales or wheezes  Cardio: RRR, S1 and S2 present, no S3 or S4, no rubs or murmurs  Abdomen: soft, nontender, nondistended, bowel sounds present x 4, no masses, no hepatosplenomegaly  Extremities: no erythema or edema  Psych: normal mood, normal mentation        ----- Service Performed and Documented by Resident or Fellow ------              "

## 2023-08-10 NOTE — PROGRESS NOTES
Preceptor Attestation:  Patient's case reviewed and discussed with Juhi Escalona MD resident and I evaluated the patient. I agree with written assessment and plan of care.  Supervising Physician:  JAZZY SIMMONS MD  [facility]

## 2023-08-11 ENCOUNTER — TELEPHONE (OUTPATIENT)
Dept: FAMILY MEDICINE | Facility: CLINIC | Age: 68
End: 2023-08-11
Payer: COMMERCIAL

## 2023-08-11 ENCOUNTER — APPOINTMENT (OUTPATIENT)
Dept: INTERPRETER SERVICES | Facility: CLINIC | Age: 68
End: 2023-08-11
Payer: COMMERCIAL

## 2023-08-11 LAB
ANION GAP SERPL CALCULATED.3IONS-SCNC: 14 MMOL/L (ref 7–15)
BUN SERPL-MCNC: 20.6 MG/DL (ref 8–23)
CALCIUM SERPL-MCNC: 9.6 MG/DL (ref 8.8–10.2)
CHLORIDE SERPL-SCNC: 92 MMOL/L (ref 98–107)
CREAT SERPL-MCNC: 0.85 MG/DL (ref 0.67–1.17)
DEPRECATED HCO3 PLAS-SCNC: 24 MMOL/L (ref 22–29)
GFR SERPL CREATININE-BSD FRML MDRD: >90 ML/MIN/1.73M2
GLUCOSE SERPL-MCNC: 593 MG/DL (ref 70–99)
HBA1C MFR BLD: 14.7 %
POTASSIUM SERPL-SCNC: 4.9 MMOL/L (ref 3.4–5.3)
SODIUM SERPL-SCNC: 130 MMOL/L (ref 136–145)

## 2023-08-11 NOTE — TELEPHONE ENCOUNTER
Notified of critical lab result.    Patient with a history of diabetes.  A1c in clinic yesterday greater than 15, sent out for confirmation.    BMP resulted with a glucose of 593.  Corrected sodium within normal limits.  Potassium normal, normal bicarb, no anion gap.  Normal kidney function.    He was started on Lantus.    Attempted to call patient overnight to relay critical lab results and evaluate symptoms.  No answer, left a message.    Will have our clinic reach out to him in the morning to see how he is doing/check on blood sugars/ensure he is tolerating insulin.    Also touch base with Dr. Escalona in the morning.    Jim Mccoy MD PGY-3  [facility]

## 2023-08-15 NOTE — RESULT ENCOUNTER NOTE
Writer called patient in an attempt to make contact with patient for follow up. No answer, did not leave message with this attempt. Bladimir KINGSTON

## 2023-10-31 ENCOUNTER — APPOINTMENT (OUTPATIENT)
Dept: INTERPRETER SERVICES | Facility: CLINIC | Age: 68
End: 2023-10-31
Payer: COMMERCIAL

## 2023-11-08 ENCOUNTER — PATIENT OUTREACH (OUTPATIENT)
Dept: CARE COORDINATION | Facility: CLINIC | Age: 68
End: 2023-11-08

## 2023-11-08 ENCOUNTER — OFFICE VISIT (OUTPATIENT)
Dept: PHARMACY | Facility: CLINIC | Age: 68
End: 2023-11-08
Payer: COMMERCIAL

## 2023-11-08 ENCOUNTER — OFFICE VISIT (OUTPATIENT)
Dept: FAMILY MEDICINE | Facility: CLINIC | Age: 68
End: 2023-11-08
Payer: COMMERCIAL

## 2023-11-08 DIAGNOSIS — Z12.12 SCREENING FOR COLORECTAL CANCER: ICD-10-CM

## 2023-11-08 DIAGNOSIS — S76.312A STRAIN OF LEFT HAMSTRING MUSCLE, INITIAL ENCOUNTER: ICD-10-CM

## 2023-11-08 DIAGNOSIS — M79.89 PALPABLE MASS OF SOFT TISSUE OF FOOT: ICD-10-CM

## 2023-11-08 DIAGNOSIS — Z12.11 SCREENING FOR COLORECTAL CANCER: ICD-10-CM

## 2023-11-08 DIAGNOSIS — E11.65 TYPE 2 DIABETES MELLITUS WITH HYPERGLYCEMIA, WITHOUT LONG-TERM CURRENT USE OF INSULIN (H): Primary | ICD-10-CM

## 2023-11-08 DIAGNOSIS — G89.29 CHRONIC PAIN OF LEFT KNEE: ICD-10-CM

## 2023-11-08 DIAGNOSIS — E11.69 TYPE 2 DIABETES MELLITUS WITH OTHER SPECIFIED COMPLICATION, WITHOUT LONG-TERM CURRENT USE OF INSULIN (H): Primary | ICD-10-CM

## 2023-11-08 DIAGNOSIS — M25.562 CHRONIC PAIN OF LEFT KNEE: ICD-10-CM

## 2023-11-08 LAB — HBA1C MFR BLD: 11.5 % (ref 0–5.6)

## 2023-11-08 PROCEDURE — 80061 LIPID PANEL: CPT

## 2023-11-08 PROCEDURE — 99214 OFFICE O/P EST MOD 30 MIN: CPT | Mod: GC

## 2023-11-08 PROCEDURE — 36415 COLL VENOUS BLD VENIPUNCTURE: CPT

## 2023-11-08 PROCEDURE — 99207 PR NO CHARGE LOS: CPT | Performed by: PHARMACIST

## 2023-11-08 PROCEDURE — 83036 HEMOGLOBIN GLYCOSYLATED A1C: CPT

## 2023-11-08 RX ORDER — ATORVASTATIN CALCIUM 40 MG/1
40 TABLET, FILM COATED ORAL DAILY
Qty: 90 TABLET | Refills: 4 | Status: SHIPPED | OUTPATIENT
Start: 2023-11-08 | End: 2023-11-10

## 2023-11-08 NOTE — PATIENT INSTRUCTIONS
RESTART jardiance, insulin and atorvastatin.   Someone will call to with your lab results and to schedule eye exam.   Try stretches for your hamstring pain, as well as icing. We will also plan to cut out foot mass at upcoming visit.   We will see you back in 2-4 weeks, keep checking blood sugars and writing down.

## 2023-11-08 NOTE — PROGRESS NOTES
Assessment & Plan     Type 2 diabetes mellitus with other specified complication, without long-term current use of insulin (H)  Co-visit with KMS today, here for follow up. A1c improved from 14.7 three months ago to 11.5 today. Has been taking max dose metformin, ran out of jardiance last month. Has not been taking insulin or atorvastatin. Lipids above goal today. Counseled to restart all meds, refills provided. Requesting referral for annual diabetic eye exam. Diabetic foot exam normal.   - Hemoglobin A1c  - Lipid panel  - insulin pen needle (32G X 4 MM) 32G X 4 MM miscellaneous; Use 1 pen needles daily  - Adult Eye  Referral; Future  - MT SIGN LANGUAGE INTERP, FACE TO FACE, PER 15 MNS  - MT FOOT EXAM NO CHARGE    Strain of left hamstring muscle, initial encounter  Stable issue over past 6 months, no inciting injury. Tylenol and ibuprofen unhelpful but does take med from [facility] that seems to help. Provided stretch/strengthening exercises for patient to try, counseled that can take up to 18 months for sxs to resolve. Will follow up in coming weeks and consider PT referral if desired.     Palpable mass of soft tissue of foot  On dorsal foot, about 2 cm. Patient concerned about it being d/t stepping on wood at beach 25 years ago, but doubt this is related. Mass has been gradually growing with mild darkening of skin. Based on physical exam, suspect fibroma vs ganglion cyst. Will have patient return in coming weeks for excision of mass.     Screening for colorectal cancer  - Fecal colorectal cancer screen FIT; Future      Return in about 4 weeks (around 12/6/2023) for Follow up.    Marky Emanuel MD  [facility]    Bruno Gerard is a 68 year old, presenting for the following health issues:  Diabetes (Looks like at times it is very high, pt states it is more expensive to buy healthier food nowadays.)        11/8/2023     3:36 PM   Additional Questions   Roomed by Britany  "  Accompanied by Self       HPI   Hasn't been on insulin but will restart.   Ran out of jardiance sometime in October, otherwise taking metformin. Hasn't been taking atorvastatin.   A1c 11.5 today, was 14.7 three months ago.   Thinks about 1.5 years since last eye visit, hasn't scheduled yet. Requesting referral.     Has new L glute pain shooting down to knee over past 6 months.   No inciting injury. Gradually worsened. Takes \"a capsule from his country [facility].\" Seems to help. Tylenol and NSAIDs doesn't work. Gets better after walking for awhile.  Hasn't tried icing or rolling it.     Worried about mass over top of foot. Mentions stepped on piece of wood \"around 25 years\" ago on the beach, and seems to be trying to come out. \"Bruising now.\" Affirms does hurt a bit.     Review of Systems   Constitutional, HEENT, cardiovascular, pulmonary, gi and gu systems are negative, except as otherwise noted.      Objective    There were no vitals taken for this visit.  There is no height or weight on file to calculate BMI.  Physical Exam   GENERAL: healthy, alert and no distress  EYES: Eyes grossly normal to inspection, PERRL and conjunctivae and sclerae normal  RESP: lungs clear to auscultation - no rales, rhonchi or wheezes  CV: regular rate and rhythm, normal S1 S2, no S3 or S4, no murmur, click or rub, no peripheral edema and peripheral pulses strong  MS: no gross musculoskeletal defects noted, no edema  SKIN: no suspicious lesions or rashes  NEURO: Normal strength and tone, mentation intact and speech normal  PSYCH: mentation appears normal, affect normal/bright  Diabetic foot exam: normal DP and PT pulses, no trophic changes or ulcerative lesions, and normal sensory exam - of note, does have ~2cm soft tissue mass with mild skin darkening on dorsum of R foot.     Results for orders placed or performed in visit on 11/08/23   Hemoglobin A1c     Status: Abnormal   Result Value Ref Range    Hemoglobin A1C 11.5 (H) 0.0 - " 5.6 %   Lipid panel     Status: Abnormal   Result Value Ref Range    Cholesterol 226 (H) <200 mg/dL    Triglycerides 199 (H) <150 mg/dL    Direct Measure HDL 44 >=40 mg/dL    LDL Cholesterol Calculated 142 (H) <=100 mg/dL    Non HDL Cholesterol 182 (H) <130 mg/dL    Narrative    Cholesterol  Desirable:  <200 mg/dL    Triglycerides  Normal:  Less than 150 mg/dL  Borderline High:  150-199 mg/dL  High:  200-499 mg/dL  Very High:  Greater than or equal to 500 mg/dL    Direct Measure HDL  Female:  Greater than or equal to 50 mg/dL   Male:  Greater than or equal to 40 mg/dL    LDL Cholesterol  Desirable:  <100mg/dL  Above Desirable:  100-129 mg/dL   Borderline High:  130-159 mg/dL   High:  160-189 mg/dL   Very High:  >= 190 mg/dL    Non HDL Cholesterol  Desirable:  130 mg/dL  Above Desirable:  130-159 mg/dL  Borderline High:  160-189 mg/dL  High:  190-219 mg/dL  Very High:  Greater than or equal to 220 mg/dL       ----- Service Performed and Documented by Resident or Fellow ------

## 2023-11-08 NOTE — PROGRESS NOTES
Clinic Care Coordination Contact  Follow Up Progress Note      Assessment: There was a care coordination referral put in for the pt for food. I met with the pt, I printed out the food shelf in the pt area and gave it to him.     Care Gaps:    Health Maintenance Due   Topic Date Due    ADVANCE CARE PLANNING  Never done    EYE EXAM  Never done    COLORECTAL CANCER SCREENING  Never done    RSV VACCINE (Pregnancy & 60+) (1 - 1-dose 60+ series) Never done    ZOSTER IMMUNIZATION (2 of 3) 01/07/2014    MEDICARE ANNUAL WELLNESS VISIT  Never done    FALL RISK ASSESSMENT  Never done    INFLUENZA VACCINE (1) 09/01/2023    COVID-19 Vaccine (4 - 2023-24 season) 09/01/2023    LIPID  09/14/2023    DIABETIC FOOT EXAM  09/14/2023    MICROALBUMIN  10/24/2023    DTAP/TDAP/TD IMMUNIZATION (4 - Td or Tdap) 11/12/2023           Care Plans      Intervention/Education provided during outreach:               Plan:     Care Coordinator will follow up in

## 2023-11-08 NOTE — PROGRESS NOTES
Medication Therapy Management (MTM) Encounter    ASSESSMENT:                            Medication Adherence/Access: Medication access issues, also concern for food insecurity.    Type 2 diabetes mellitus with hyperglycemia, without long-term current use of insulin (H)  A1c remains above goal today <7-8%, could consider higher goal given older age. Degree of elevation needing injectable agent. Prescribed appropriate statin.     Chronic pain of left knee  Uncontrolled. Need further assessment of product using at home.       PLAN:                            Resent oral medicine refills to new pharmacy (old pharmacy closing 11/30), educated patient of this  Reviewed need for injectable agent with patient who agreed  Dr. Emanuel to assess knee pain further; requested pt bring the medicine he is using to clinic next time so can assess safety/efficacy    Follow-up: Return in about 4 weeks (around 12/6/2023) for Blood sugar (diabetes) follow up.    Medication issues to be addressed at a future visit     Once blood glucose improves a bit, consider SGLT2 for additional blood glucose lowering without great risk of hypoglycemia (given older age) and ASCVD benefits    SUBJECTIVE/OBJECTIVE:                          Moustapha Weaver is a 68 year old male coming in for a follow-up visit from 3/15/2023. Today's visit is a co-visit with Dr. Emanuel.  (ID# [id number]) was used during today's visit.       Reason for visit: Diabetes follow up.    Allergies/ADRs: Reviewed in chart  Past Medical History: Reviewed in chart  Social History     Tobacco Use    Smoking status: Never     Passive exposure: Never    Smokeless tobacco: Never   Vaping Use    Vaping Use: Never used     ^Reviewed today    Medication Adherence/Access: See below    Type 2 diabetes mellitus with hyperglycemia, without long-term current use of insulin (H)  Now retired. Would like resources to help with food stamps and food resources. Had poor experience in the past  "with someone in clinic about this topic. Reports that has to eat the foods that are available, concerned with the food impact on his blood glucose.     Metformin tablet taking currently 1 tablet twice daily. Jardiance out of refills, ran out sometime in October.    Ozempic, not taking. \"Not good to me\", weight loss, was down to 114#.     Checking blood glucose at home. Checking randomly, maybe not every day. No meter available today in clinic.     Lantus never received, unsure why. OK with restarting if needed, comfortable with injections.     Chronic pain of left knee  Pain from glut to knee. Didn't find Diclofenac helpful. Using medicine that obtained in [facility] that helps with pain.       Today's Vitals:   BP Readings from Last 1 Encounters:   08/10/23 133/80     Pulse Readings from Last 1 Encounters:   08/10/23 83     Wt Readings from Last 1 Encounters:   08/10/23 127 lb (57.6 kg)     Ht Readings from Last 1 Encounters:   08/10/23 5' 4.37\" (1.635 m)     Estimated body mass index is 21.55 kg/m  as calculated from the following:    Height as of 8/10/23: 5' 4.37\" (1.635 m).    Weight as of 8/10/23: 127 lb (57.6 kg).    Temp Readings from Last 1 Encounters:   08/10/23 97.6  F (36.4  C) (Oral)       ----------------      I spent 30 minutes with this patient today. Dr. Emanuel (resident physician) was provided the recommendations above  in clinic today and Dr. Fay is the authorizing prescriber for this visit through the pharmacist collaborative practice agreement.. A copy of the visit note was provided to the patient's provider(s).    The patient was given to the patient a summary of these recommendations. See Provider note/AVS from today.     Fani Rosenthal, Pharm.D., [facility]  Phone: [phone]    For insurance/tracking purposes, MTM Team Documentation:   Medication Therapy Recommendations  Type 2 diabetes mellitus with other specified complication, without " long-term current use of insulin (H)    Current Medication: atorvastatin (LIPITOR) 40 MG tablet (Discontinued)   Rationale: Medication product not available - Adherence - Adherence   Recommendation: Provide Education   Status: Patient Agreed - Adherence/Education          Current Medication: insulin glargine (LANTUS PEN) 100 UNIT/ML pen (Discontinued)   Rationale: Synergistic therapy - Needs additional medication therapy - Indication   Recommendation: Start Medication   Status: Accepted per Provider

## 2023-11-08 NOTE — Clinical Note
"Sorry, Epic fail.  You can look at the \"start date\" to know the last time they were sent and the refills to know if you need to resend them. "

## 2023-11-08 NOTE — Clinical Note
Just to make less work for yourself in the future, can consider sending statins with a years worth of refills!  Also, I had sent all his oral medicines before your encounter! Sorry you did them again. You can look at the

## 2023-11-09 LAB
CHOLEST SERPL-MCNC: 226 MG/DL
HDLC SERPL-MCNC: 44 MG/DL
LDLC SERPL CALC-MCNC: 142 MG/DL
NONHDLC SERPL-MCNC: 182 MG/DL
TRIGL SERPL-MCNC: 199 MG/DL

## 2023-11-10 DIAGNOSIS — E11.65 TYPE 2 DIABETES MELLITUS WITH HYPERGLYCEMIA, WITH LONG-TERM CURRENT USE OF INSULIN (H): ICD-10-CM

## 2023-11-10 DIAGNOSIS — E11.65 TYPE 2 DIABETES MELLITUS WITH HYPERGLYCEMIA, WITHOUT LONG-TERM CURRENT USE OF INSULIN (H): ICD-10-CM

## 2023-11-10 DIAGNOSIS — Z79.4 TYPE 2 DIABETES MELLITUS WITH HYPERGLYCEMIA, WITH LONG-TERM CURRENT USE OF INSULIN (H): ICD-10-CM

## 2023-11-10 RX ORDER — ATORVASTATIN CALCIUM 40 MG/1
40 TABLET, FILM COATED ORAL DAILY
Qty: 90 TABLET | Refills: 0 | Status: SHIPPED | OUTPATIENT
Start: 2023-11-10 | End: 2023-11-15

## 2023-11-12 PROBLEM — S76.312A STRAIN OF LEFT HAMSTRING MUSCLE, INITIAL ENCOUNTER: Status: ACTIVE | Noted: 2023-11-12

## 2023-11-12 PROBLEM — M79.89 PALPABLE MASS OF SOFT TISSUE OF FOOT: Status: ACTIVE | Noted: 2023-11-12

## 2023-11-12 RX ORDER — BLOOD-GLUCOSE METER
1 EACH MISCELLANEOUS DAILY
COMMUNITY
Start: 2023-05-18

## 2023-11-13 ENCOUNTER — DOCUMENTATION ONLY (OUTPATIENT)
Dept: CARE COORDINATION | Facility: CLINIC | Age: 68
End: 2023-11-13

## 2023-11-15 ENCOUNTER — ALLIED HEALTH/NURSE VISIT (OUTPATIENT)
Dept: FAMILY MEDICINE | Facility: CLINIC | Age: 68
End: 2023-11-15
Payer: COMMERCIAL

## 2023-11-15 ENCOUNTER — PATIENT OUTREACH (OUTPATIENT)
Dept: CARE COORDINATION | Facility: CLINIC | Age: 68
End: 2023-11-15

## 2023-11-15 DIAGNOSIS — Z79.4 TYPE 2 DIABETES MELLITUS WITH HYPERGLYCEMIA, WITH LONG-TERM CURRENT USE OF INSULIN (H): ICD-10-CM

## 2023-11-15 DIAGNOSIS — E11.65 TYPE 2 DIABETES MELLITUS WITH HYPERGLYCEMIA, WITH LONG-TERM CURRENT USE OF INSULIN (H): ICD-10-CM

## 2023-11-15 DIAGNOSIS — E11.65 TYPE 2 DIABETES MELLITUS WITH HYPERGLYCEMIA, WITHOUT LONG-TERM CURRENT USE OF INSULIN (H): ICD-10-CM

## 2023-11-15 DIAGNOSIS — Z59.9 FINANCIAL DIFFICULTIES: Primary | ICD-10-CM

## 2023-11-15 DIAGNOSIS — E11.69 TYPE 2 DIABETES MELLITUS WITH OTHER SPECIFIED COMPLICATION, WITHOUT LONG-TERM CURRENT USE OF INSULIN (H): ICD-10-CM

## 2023-11-15 PROCEDURE — 99207 PR NO CHARGE NURSE ONLY: CPT

## 2023-11-15 RX ORDER — ATORVASTATIN CALCIUM 40 MG/1
40 TABLET, FILM COATED ORAL DAILY
Qty: 90 TABLET | Refills: 3 | Status: SHIPPED | OUTPATIENT
Start: 2023-11-15 | End: 2024-06-25

## 2023-11-15 SDOH — ECONOMIC STABILITY - INCOME SECURITY: PROBLEM RELATED TO HOUSING AND ECONOMIC CIRCUMSTANCES, UNSPECIFIED: Z59.9

## 2023-11-15 ASSESSMENT — ACTIVITIES OF DAILY LIVING (ADL): DEPENDENT_IADLS:: INDEPENDENT

## 2023-11-15 NOTE — CONFIDENTIAL NOTE
Patient requested all medications be sent to [facility] [address], [place] [address] due to financial barriers.    NELIDA MILIAN, CRISTHIANSW, LADC

## 2023-11-15 NOTE — PROGRESS NOTES
Clinic Care Coordination Contact  Clinic Care Coordination Contact  OUTREACH    Referral Information:  Referral Source: Care Team    Primary Diagnosis: Diabetes    Chief Complaint   Patient presents with    Clinic Care Coordination - Initial        Universal Utilization:   Clinic Utilization  Difficulty keeping appointments:: No  Compliance Concerns: No  No-Show Concerns: No  No PCP office visit in Past Year: No  Utilization      No Show Count (past year)  6             ED Visits  0             Hospital Admissions  0                    Current as of: 11/15/2023  2:01 AM                Clinical Concerns:  Current Medical Concerns:  diabetes type 2  Current Behavioral Concerns: Medication affordability   Education Provided to patient: patient assistance program, pharmacy for lowest cost medications and diabetic testing supplies, good rx coupons     Health Maintenance Reviewed: Due/Overdue   Clinical Pathway: None    Medication Management:  Medication review status: Medications reviewed and no changes reported per patient.           Functional Status:  Dependent ADLs:: Independent  Dependent IADLs:: Independent  Bed or wheelchair confined:: No  Mobility Status: Independent    Living Situation:  Current living arrangement:: I live in a private home    Lifestyle & Psychosocial Needs:    Social Determinants of Health     Food Insecurity: Low Risk  (11/8/2023)    Food Insecurity     Within the past 12 months, did you worry that your food would run out before you got money to buy more?: No     Within the past 12 months, did the food you bought just not last and you didn t have money to get more?: No   Depression: Not at risk (11/8/2023)    PHQ-2     PHQ-2 Score: 0   Housing Stability: Low Risk  (11/8/2023)    Housing Stability     Do you have housing? : Yes     Are you worried about losing your housing?: No   Tobacco Use: Low Risk  (4/17/2023)    Patient History     Smoking Tobacco Use: Never     Smokeless Tobacco Use:  Never     Passive Exposure: Never   Financial Resource Strain: Low Risk  (11/8/2023)    Financial Resource Strain     Within the past 12 months, have you or your family members you live with been unable to get utilities (heat, electricity) when it was really needed?: No   Alcohol Use: Not on file   Transportation Needs: Low Risk  (11/8/2023)    Transportation Needs     Within the past 12 months, has lack of transportation kept you from medical appointments, getting your medicines, non-medical meetings or appointments, work, or from getting things that you need?: No   Physical Activity: Not on file   Interpersonal Safety: Not on file   Stress: Not on file   Social Connections: Not on file     Diet:: Diabetic diet  Inadequate nutrition (GOAL):: No  Tube Feeding: No  Inadequate activity/exercise (GOAL):: No  Significant changes in sleep pattern (GOAL): No  Transportation means:: Accessible car     Mandaen or spiritual beliefs that impact treatment:: No  Mental health DX:: No  Mental health management concern (GOAL):: No  Chemical Dependency Status: No Current Concerns  Informal Support system:: Children      Resources and Interventions:  Current Resources:      Community Resources: None  Supplies Currently Used at Home: Diabetic Supplies  Equipment Currently Used at Home: none     Advance Care Plan/Directive  Advanced Care Plans/Directives on file:: No    Referrals Placed: Other  (Patient Assistance Program-Medications)     Care Plan:  Care Plan: Medication Regimen       Problem: Medication Affordabilty       Priority: High Onset Date: 11/15/2023    Note:     I will complete and submit patient assistance form for Jardiance.    I will work with Pharmacist and  to identify options for medication costs.        Goal: Consistently take Medications as Prescribed                             Patient/Caregiver understanding: Yes       Future Appointments                [facility]  PROCED[facility] ROOM [facility]    In 1 month Fani Gary RPH [facility] [place] [facility] [place]    In 1 month Juhi Escalona MD [facility] [place] [place]            Plan:     Patient to  medications from [facility] [place] [address].    Patient to call clinic if further concerns.    NELIDA MILIAN, LGSW, LADC

## 2023-11-15 NOTE — TELEPHONE ENCOUNTER
Sent per CPA.     Fani Rosenthal, Pharm.D., [facility]  Phone: [phone]  November 15, 2023 at 4:34 PM

## 2023-11-17 ENCOUNTER — DOCUMENTATION ONLY (OUTPATIENT)
Dept: CARE COORDINATION | Facility: CLINIC | Age: 68
End: 2023-11-17
Payer: COMMERCIAL

## 2023-11-17 NOTE — CONFIDENTIAL NOTE
ANA ROSA faxed Patient Assistance Application to [facility] this date. Copy Scanned to chart.    NELIDA MILIAN, DAVID, LADC

## 2024-01-18 ENCOUNTER — OFFICE VISIT (OUTPATIENT)
Dept: FAMILY MEDICINE | Facility: CLINIC | Age: 69
End: 2024-01-18
Payer: COMMERCIAL

## 2024-01-18 VITALS
RESPIRATION RATE: 13 BRPM | SYSTOLIC BLOOD PRESSURE: 131 MMHG | DIASTOLIC BLOOD PRESSURE: 82 MMHG | OXYGEN SATURATION: 98 % | HEART RATE: 80 BPM | HEIGHT: 64 IN | BODY MASS INDEX: 22.88 KG/M2 | WEIGHT: 134 LBS | TEMPERATURE: 97.8 F

## 2024-01-18 DIAGNOSIS — E11.65 TYPE 2 DIABETES MELLITUS WITH HYPERGLYCEMIA, WITH LONG-TERM CURRENT USE OF INSULIN (H): Primary | ICD-10-CM

## 2024-01-18 DIAGNOSIS — Z79.4 TYPE 2 DIABETES MELLITUS WITH HYPERGLYCEMIA, WITH LONG-TERM CURRENT USE OF INSULIN (H): Primary | ICD-10-CM

## 2024-01-18 PROCEDURE — 99214 OFFICE O/P EST MOD 30 MIN: CPT | Performed by: FAMILY MEDICINE

## 2024-01-18 NOTE — PROGRESS NOTES
Assessment & Plan     Type 2 diabetes mellitus with hyperglycemia, with long-term current use of insulin (H)  Patient has longstanding, uncontrolled type 2 diabetes with last A1c in November of 11.5%.  Needs improved glucose control for upcoming cataract surgery.  As a result, family is assisting with him checking blood sugars and it was above 500 yesterday.  They would like short acting insulin for these times.  Explained that ideally, his diabetes to be managed with a stable daily regimen.  For now, continue Jardiance 10 mg daily, metformin 1000 mg twice daily and will start Lantus 10 units daily with a recommendation to titrate up by 2 units daily until fasting blood sugar is less than 150.  With shared decision making with the patient and family,  we agreed on NovoLog 5 units as needed for blood glucose greater than 500.  Also sending glucose tabs to have as needed for hypoglycemia.  - empagliflozin (JARDIANCE) 10 MG TABS tablet; Take 1 tablet (10 mg) by mouth daily  - glucose (BD GLUCOSE) 4 g chewable tablet; Take 1 tablet by mouth every hour as needed for low blood sugar  - insulin aspart (NOVOLOG PEN) 100 UNIT/ML pen; Inject 5 Units Subcutaneous as needed for high blood sugar (use when blood glucose over 500)  - insulin glargine (LANTUS PEN) 100 UNIT/ML pen; Inject 10 Units Subcutaneous at bedtime Increase by 2 units daily until fasting blood sugar is less than 150 and then continue that same dose.      Return in about 1 week (around 1/25/2024) for pharmacy visit.    Bruno Gerard is a 68 year old with type 2 diabetes, presenting for the following health issues:  high blood sugar (Was at 555 when he checked last night, had to monitor all night/Unsure how to control at this time as he already took his medications)      1/18/2024    11:10 AM   Additional Questions   Roomed by Subha   Accompanied by son       Patient here with son today with concerns about what to do about his high blood sugars.  Has  "upcoming cataract surgery and needs to have diabetes control.  As result, he was helping his father check blood sugar last night and it was over 500.  Patient has longstanding diabetes, typically not well-controlled.  Last A1c 11.5% in mid- November.  Per chart, he was supposed to start taking Lantus in addition to Jardiance and metformin.  Patient says he never did start the Lantus.    He usually checks blood glucose every 2 to 3 days and it tends to run around 180 after eating.  Last night when his son checked his blood sugar around 10 PM, it was 580.  Over the course of the evening he eventually took 40 units of Lantus and blood glucose levels reduced to 150 by 8 AM this morning.  Patient said he was asymptomatic the entire time.  In fact, he has had blood sugars as high as 900 without symptoms.    Patient's family is wondering what to do when his blood sugars are this high.  One of his sons is a pharmacy tech and he wonders if he should have short acting insulin.  He does like him to have glucose tabs at home.    PMH reviewed    Medications:  Currently taking Jardiance 10 mg daily, metformin 1000 mg twice daily      Objective    /82 (BP Location: Right arm, Patient Position: Sitting, Cuff Size: Adult Regular)   Pulse 80   Temp 97.8  F (36.6  C) (Oral)   Resp 13   Ht 1.63 m (5' 4.17\")   Wt 60.8 kg (134 lb)   SpO2 98%   BMI 22.88 kg/m    Body mass index is 22.88 kg/m .     GENERAL: alert and no distress  MS: no gross musculoskeletal defects noted, no edema  PSYCH: mentation appears normal, affect normal/bright    Office Visit on 11/08/2023   Component Date Value Ref Range Status    Hemoglobin A1C 11/08/2023 11.5 (H)  0.0 - 5.6 % Final    Normal <5.7%   Prediabetes 5.7-6.4%    Diabetes 6.5% or higher     Note: Adopted from ADA consensus guidelines.    Cholesterol 11/08/2023 226 (H)  <200 mg/dL Final    Triglycerides 11/08/2023 199 (H)  <150 mg/dL Final    Direct Measure HDL 11/08/2023 44  >=40 mg/dL " Final    LDL Cholesterol Calculated 11/08/2023 142 (H)  <=100 mg/dL Final    Non HDL Cholesterol 11/08/2023 182 (H)  <130 mg/dL Final           Signed Electronically by: Teresita Hall MD

## 2024-01-18 NOTE — LETTER
RETURN TO WORK/SCHOOL FORM    1/18/2024    Re: Moustapha Weaver  1955      To Whom It May Concern:     Moustapha Weaver was seen in clinic today. (Tan) his son was with him as well. He may return to work without restrictions on 1/19/24          Restrictions:  Nonefull duty      Teresita Hall MD  1/18/2024 11:54 AM

## 2024-01-24 ENCOUNTER — OFFICE VISIT (OUTPATIENT)
Dept: FAMILY MEDICINE | Facility: CLINIC | Age: 69
End: 2024-01-24
Payer: COMMERCIAL

## 2024-01-24 ENCOUNTER — OFFICE VISIT (OUTPATIENT)
Dept: PHARMACY | Facility: CLINIC | Age: 69
End: 2024-01-24

## 2024-01-24 VITALS
BODY MASS INDEX: 23.21 KG/M2 | WEIGHT: 131 LBS | RESPIRATION RATE: 20 BRPM | SYSTOLIC BLOOD PRESSURE: 117 MMHG | OXYGEN SATURATION: 98 % | DIASTOLIC BLOOD PRESSURE: 80 MMHG | TEMPERATURE: 98.1 F | HEIGHT: 63 IN | HEART RATE: 81 BPM

## 2024-01-24 DIAGNOSIS — Z79.4 TYPE 2 DIABETES MELLITUS WITH HYPERGLYCEMIA, WITH LONG-TERM CURRENT USE OF INSULIN (H): ICD-10-CM

## 2024-01-24 DIAGNOSIS — E11.65 TYPE 2 DIABETES MELLITUS WITH HYPERGLYCEMIA, WITH LONG-TERM CURRENT USE OF INSULIN (H): ICD-10-CM

## 2024-01-24 DIAGNOSIS — E11.69 TYPE 2 DIABETES MELLITUS WITH OTHER SPECIFIED COMPLICATION, WITHOUT LONG-TERM CURRENT USE OF INSULIN (H): Primary | ICD-10-CM

## 2024-01-24 PROCEDURE — 99214 OFFICE O/P EST MOD 30 MIN: CPT | Mod: GC

## 2024-01-24 PROCEDURE — 99207 PR NO CHARGE LOS: CPT | Performed by: PHARMACIST

## 2024-01-24 RX ORDER — RESPIRATORY SYNCYTIAL VIRUS VACCINE 120MCG/0.5
0.5 KIT INTRAMUSCULAR ONCE
Qty: 1 EACH | Refills: 0 | Status: CANCELLED | OUTPATIENT
Start: 2024-01-24 | End: 2024-01-24

## 2024-01-24 NOTE — PROGRESS NOTES
Medication Therapy Management (MTM) Encounter    ASSESSMENT:                            Medication Adherence/Access: Concern for cost, however supported by family. Need ongoing conversation to ensure this doesn't pose additional barrier in future. Anticipate that paid at least 3 monthly copays for insulin (1 pen/month; 5 pens=5 months), could be reason for high price.     Type 2 diabetes mellitus with other specified complication, without long-term current use of insulin (H)  Last A1c above goal <7.5%. Previous trial of GLP1 affected weight too much per patient; unclear if this is due to affect on appetite or just due to ongoing hyperglycemia. Given elevation in A1c, likely to need meal time coverage in addition to basal. Also opportunity to titrate Metformin, not taking as intended.       PLAN:                            Start Novolog 4 units with largest meal  Increase Metformin to 1000 mg twice daily.   Dr. Fernandez - increase Lantus to 12 units daily, sent CGM Rx.     Follow-up: Return in about 2 weeks (around 2/7/2024) for Blood sugar (diabetes) follow up.    Medication issues to be addressed at a future visit     With visual impairments, check to see how pt dials dose, ?by sound    SUBJECTIVE/OBJECTIVE:                          Moustapha Weaver is a 68 year old male coming in for a follow-up visit from 11/2023. Today's visit is a co-visit with Dr. Fernandez. Patient was accompanied by son, who serves as  for some of visit.      Reason for visit: diabetes f/u.    Allergies/ADRs: Reviewed in chart  Past Medical History: Reviewed in chart  Social History     Tobacco Use    Smoking status: Never     Passive exposure: Never    Smokeless tobacco: Never   Vaping Use    Vaping Use: Never used     ^Reviewed today    Medication Adherence/Access: Some concerns for cost today per son. Cost of Jardiance >$500. However they paid for it to help him meet the deductible. Insulin cost was also somewhat high, received 5 pens  "of each insulin type.     Type 2 diabetes mellitus with other specified complication, without long-term current use of insulin (H)  Dr. Fernandez today reviewed new basal insulin regimen implemented by Dr. Hall on 1/18/2024. Pt has been using 10 units daily of Lantus. Has not yet used Novolog (was to be used only if blood glucose >500), however pt and family confirm that they pick it up from the pharmacy. Has been taking Jardiance 10 mg/day. Pt interested in CGM device today. Blood glucose largely in 200s per patient report. No concerns for hypoglycemia. Has also been taking Metformin 1000 mg/day. Pt doing his own insulin injections, son fearful of needles.       Today's Vitals:   BP Readings from Last 1 Encounters:   01/24/24 117/80     Pulse Readings from Last 1 Encounters:   01/24/24 81     Wt Readings from Last 1 Encounters:   01/24/24 131 lb (59.4 kg)     Ht Readings from Last 1 Encounters:   01/24/24 5' 3.39\" (1.61 m)     Estimated body mass index is 22.92 kg/m  as calculated from the following:    Height as of an earlier encounter on 1/24/24: 5' 3.39\" (1.61 m).    Weight as of an earlier encounter on 1/24/24: 131 lb (59.4 kg).    Temp Readings from Last 1 Encounters:   01/24/24 98.1  F (36.7  C) (Oral)       ----------------      I spent 20 minutes with this patient today. Dr. Fernandez  (resident physician) was provided the recommendations above  in clinic today and Dr. Martinez is the authorizing prescriber for this visit through the pharmacist collaborative practice agreement.. A copy of the visit note was provided to the patient's provider(s).    The patient was given to the patient a summary of these recommendations. See Provider note/AVS from today.     Fani Rosenthal, Pharm.D., [facility]  Phone: [phone]    For insurance/tracking purposes, MTM Team Documentation:   Medication Therapy Recommendations  Type 2 diabetes mellitus with other specified complication, " without long-term current use of insulin (H)    Current Medication: insulin aspart (NOVOLOG PEN) 100 UNIT/ML pen   Rationale: Synergistic therapy - Needs additional medication therapy - Indication   Recommendation: Start Medication   Status: Accepted per CPA          Current Medication: metFORMIN (GLUCOPHAGE) 1000 MG tablet   Rationale: Does not understand instructions - Adherence - Adherence   Recommendation: Provide Education   Status: Patient Agreed - Adherence/Education

## 2024-01-24 NOTE — PROGRESS NOTES
Assessment & Plan   Moustapha is a 68M w/PMHx significant for uncontrolled T2DM, presenting for follow-up after initiating insulin.     Type 2 diabetes mellitus with other specified complication, without long-term current use of insulin (H)  Last A1c 11.5 on 11/8/23. Needs better control in order to get cataract surgery. Since his last visit, he was initiated on lantus 10u d - did not self-titrate. Was also started on aspart for BG > 500, but son reports that it was never given as patient never got this high. BG ~200s at home, but this was after he ate, unsure about fastings. Denies hypoglycemic symptoms.   - Continuous Blood Gluc  (FREESTYLE WYATT 2 READER) JT; Use to read blood sugars as per 's instructions.  - Continuous Blood Gluc Sensor (FREESTYLE WYATT 2 SENSOR) MISC; Change every 14 days.  - Increase lantus to 12u d  - insulin aspart (NOVOLOG PEN) 100 UNIT/ML pen; Inject 4 Units Subcutaneous daily with food - to take with biggest meal.   - Follow-up in 2 wks  - Goal A1c ~8, will allow for some flexibility in control      Ivno Fernadnez MD PGY-1  [facility]   5:27 PM 1/25/2024      Subjective   Moustapha is a 68 year old, presenting for the following health issues:  Refill Request and Recheck Medication        1/24/2024     3:48 PM   Additional Questions   Roomed by Ana Maria   Accompanied by Son     HPI     DM follow up  - Current medications: Jardiance 10mg d, metformin 1000mg BID, lantus 10u d (titrate up by 2u until fasting BG < 150) - adjusted by Dr. Hall on 1/18/24.Has only been taking metformin once per day only.   Any problems/side effects after medication change: No    - BG values: 233 highest today (checks morning, afternoon, and night)  - Last A1C: 11.5 on 11/8/23  - Last Urine Microalbumin: Undetectable when checked 10/24/22  - Preventative Meds:    ASA -  No   ACEI/ARB - No    Statin - Atorvastatin 40mg d    - Hypoglycemia sx: Denies headaches,  "lightheadedness, confusion, diaphoresis, tremor, palpitations  - Hyperglycemia sx: Denies fever/chills, polyuria, polydipsia, polyphagia, abdominal pain       Review of Systems  ROS per HPI, otherwise negative.         Objective     /80   Pulse 81   Temp 98.1  F (36.7  C) (Oral)   Resp 20   Ht 1.61 m (5' 3.39\")   Wt 59.4 kg (131 lb)   SpO2 98%   BMI 22.92 kg/m    Body mass index is 22.92 kg/m .    Physical Exam   GENERAL: alert and no distress  RESP: Normal WOB, speaking in full sentences  CV: appears well-perfused  MS: no gross musculoskeletal defects noted, no edema  SKIN: no suspicious lesions or rashes  PSYCH: mentation appears normal, affect normal/bright    "

## 2024-01-24 NOTE — PATIENT INSTRUCTIONS
Increase your lantus to 12 units per day.   Please take 4 units of novolog with your biggest meal of the day.   Please take 1 tablet of your metformin with breakfast and a second tablet with dinner.   We will see you again in 2 weeks. Please bring your glucose monitor to your next visit.

## 2024-02-01 ENCOUNTER — TRANSFERRED RECORDS (OUTPATIENT)
Dept: MULTI SPECIALTY CLINIC | Facility: CLINIC | Age: 69
End: 2024-02-01

## 2024-02-01 LAB — RETINOPATHY: NORMAL

## 2024-05-30 ENCOUNTER — APPOINTMENT (OUTPATIENT)
Dept: INTERPRETER SERVICES | Facility: CLINIC | Age: 69
End: 2024-05-30
Payer: COMMERCIAL

## 2024-06-06 ENCOUNTER — OFFICE VISIT (OUTPATIENT)
Dept: FAMILY MEDICINE | Facility: CLINIC | Age: 69
End: 2024-06-06
Payer: COMMERCIAL

## 2024-06-06 ENCOUNTER — TELEPHONE (OUTPATIENT)
Dept: FAMILY MEDICINE | Facility: CLINIC | Age: 69
End: 2024-06-06

## 2024-06-06 VITALS
BODY MASS INDEX: 24.41 KG/M2 | DIASTOLIC BLOOD PRESSURE: 86 MMHG | WEIGHT: 143 LBS | OXYGEN SATURATION: 98 % | RESPIRATION RATE: 16 BRPM | SYSTOLIC BLOOD PRESSURE: 133 MMHG | HEART RATE: 78 BPM | HEIGHT: 64 IN

## 2024-06-06 DIAGNOSIS — H26.9 CATARACT OF BOTH EYES, UNSPECIFIED CATARACT TYPE: ICD-10-CM

## 2024-06-06 DIAGNOSIS — E11.65 TYPE 2 DIABETES MELLITUS WITH HYPERGLYCEMIA, WITH LONG-TERM CURRENT USE OF INSULIN (H): ICD-10-CM

## 2024-06-06 DIAGNOSIS — Z79.4 TYPE 2 DIABETES MELLITUS WITH HYPERGLYCEMIA, WITH LONG-TERM CURRENT USE OF INSULIN (H): ICD-10-CM

## 2024-06-06 DIAGNOSIS — I10 BENIGN ESSENTIAL HYPERTENSION: ICD-10-CM

## 2024-06-06 DIAGNOSIS — Z01.818 PREOPERATIVE EXAMINATION: Primary | ICD-10-CM

## 2024-06-06 LAB
ANION GAP SERPL CALCULATED.3IONS-SCNC: 8 MMOL/L (ref 3–14)
BUN SERPL-MCNC: 14 MG/DL (ref 7–30)
CALCIUM SERPL-MCNC: 10.4 MG/DL (ref 8.5–10.1)
CHLORIDE BLD-SCNC: 103 MMOL/L (ref 94–109)
CO2 SERPL-SCNC: 28 MMOL/L (ref 20–32)
CREAT SERPL-MCNC: 0.7 MG/DL (ref 0.66–1.25)
CREAT UR-MCNC: 23.2 MG/DL
EGFRCR SERPLBLD CKD-EPI 2021: >90 ML/MIN/1.73M2
GLUCOSE BLD-MCNC: 138 MG/DL (ref 70–99)
HBA1C MFR BLD: 7.8 % (ref 0–5.6)
MICROALBUMIN UR-MCNC: <12 MG/L
MICROALBUMIN/CREAT UR: NORMAL MG/G{CREAT}
POTASSIUM BLD-SCNC: 3.9 MMOL/L (ref 3.4–5.3)
SODIUM SERPL-SCNC: 139 MMOL/L (ref 135–145)

## 2024-06-06 PROCEDURE — 82043 UR ALBUMIN QUANTITATIVE: CPT

## 2024-06-06 PROCEDURE — 82570 ASSAY OF URINE CREATININE: CPT

## 2024-06-06 PROCEDURE — 83036 HEMOGLOBIN GLYCOSYLATED A1C: CPT

## 2024-06-06 PROCEDURE — 80048 BASIC METABOLIC PNL TOTAL CA: CPT

## 2024-06-06 PROCEDURE — 36415 COLL VENOUS BLD VENIPUNCTURE: CPT

## 2024-06-06 PROCEDURE — 99214 OFFICE O/P EST MOD 30 MIN: CPT | Mod: GC

## 2024-06-06 NOTE — PATIENT INSTRUCTIONS
Please DO NOT TAKE the following medications 3 days before your surgery:  - Jardiance    Please use 8 units of your long-acting insulin (Lantus) the NIGHT BEFORE your surgery.    Please DO NOT TAKE the following medications the morning of your surgery:  - Jardiance  - Novolog  - Metformin    You can take your atorvastatin as normal.    After your surgery, you can resume your medications as normal.  Please do this regimen for both surgeries.

## 2024-06-06 NOTE — PROGRESS NOTES
Preoperative Evaluation  [facility] [address]  [place] [address]  Phone: [phone]  Fax: [phone]  Primary Provider: Juhi Escalona MD  Pre-op Performing Provider: Juhi Escalona MD  Jun 6, 2024   {Provider  Link to PREOP SmartSet  REQUIRED to apply standard patient instructions and medication directions to the AVS :320770}  {ROOMER review and update patient entered surgical information if needed :373394}  { After Pre-op is completed, use lists to pull documentation into note Link to complete Pre-Op    :435479}  {Pull Surgical Information into note after flowsheet completed:110567}  Fax number for surgical facility: [phone]    {Provider Charting Preference for Preop :435902}    Bruno Gerard is a 68 year old, presenting for the following:  Pre-Op Exam      {(!) Visit Details have not yet been documented.  Please enter Visit Details and then use this list to pull in documentation. (Optional):610897}  HPI related to upcoming procedure: ***  {Pull Pre-Op Questionnaire into note after flowsheet completed:499192}  Health Care Directive  Patient does not have a Health Care Directive or Living Will: {ADVANCE_DIRECTIVE_STATUS:031536}    Preoperative Review of   {Mnpmpreport:190818}  {Review MNPMP for all patients per ICSI MNPMP Profile:992506}    {Chronic problem details (Optional) :508573}    Patient Active Problem List    Diagnosis Date Noted    Strain of left hamstring muscle, initial encounter 11/12/2023     Priority: Medium    Palpable mass of soft tissue of foot 11/12/2023     Priority: Medium    Benign essential hypertension 03/20/2023     Priority: Medium    Dysfunction of left eustachian tube 03/18/2023     Priority: Medium    Chronic pain of left knee 03/18/2023     Priority: Medium    Financial difficulties 03/18/2023     Priority: Medium    Poor dentition 09/18/2022     Priority: Medium    Type 2 diabetes mellitus with other specified complication,  "without long-term current use of insulin (H) 09/18/2022     Priority: Medium      Past Medical History:   Diagnosis Date    Diabetes mellitus, type 2 (H)      No past surgical history on file.  Current Outpatient Medications   Medication Sig Dispense Refill    atorvastatin (LIPITOR) 40 MG tablet Take 1 tablet (40 mg) by mouth daily 90 tablet 3    blood glucose monitoring (NO BRAND SPECIFIED) meter device kit Use to test blood sugar 1 times daily 1 kit 0    Blood Glucose Monitoring Suppl (ACCU-CHEK GUIDE ME) w/Device KIT 1 kit by to test blood sugar route daily      Continuous Blood Gluc  (FREESTYLE WYATT 2 READER) JT Use to read blood sugars as per 's instructions. 1 each 0    Continuous Blood Gluc Sensor (FREESTYLE WYATT 2 SENSOR) MISC Change every 14 days. 2 each 5    empagliflozin (JARDIANCE) 10 MG TABS tablet Take 1 tablet (10 mg) by mouth daily 90 tablet 1    glucose (BD GLUCOSE) 4 g chewable tablet Take 1 tablet by mouth every hour as needed for low blood sugar 10 tablet 1    insulin aspart (NOVOLOG PEN) 100 UNIT/ML pen Inject 4 Units Subcutaneous daily with food 15 mL 1    insulin glargine (LANTUS PEN) 100 UNIT/ML pen Inject 10 Units Subcutaneous at bedtime Increase by 2 units daily until fasting blood sugar is less than 150 and then continue that same dose. 15 mL 0    insulin pen needle (32G X 4 MM) 32G X 4 MM miscellaneous Use 1 pen needles daily 100 each 1    metFORMIN (GLUCOPHAGE) 1000 MG tablet Take 1 tablet (1,000 mg) by mouth 2 times daily (with meals) 180 tablet 4       No Known Allergies     Social History     Tobacco Use    Smoking status: Never     Passive exposure: Never    Smokeless tobacco: Never   Substance Use Topics    Alcohol use: Not on file     {FAMILY HISTORY (Optional):088086168}  History   Drug Use Not on file           {ROS Picklists (Optional):984264}    Objective    /86   Pulse 78   Resp 16   Ht 1.62 m (5' 3.78\")   Wt 64.9 kg (143 lb)   SpO2 98%   " "BMI 24.72 kg/m     Estimated body mass index is 24.72 kg/m  as calculated from the following:    Height as of this encounter: 1.62 m (5' 3.78\").    Weight as of this encounter: 64.9 kg (143 lb).  Physical Exam  {Exam List :223598}    Recent Labs   Lab Test 23  1523 08/10/23  1611   NA  --  130*   POTASSIUM  --  4.9   CR  --  0.85   A1C 11.5* 14.7*        Diagnostics  {LABS:833486}   {EK}    Revised Cardiac Risk Index (RCRI)  The patient has the following serious cardiovascular risks for perioperative complications:  {PREOP REVISED CARDIAC RISK INDEX (RCRI) :177879}     RCRI Interpretation: {REVISED CARDIAC RISK INTERPRETATION :887975}         Signed Electronically by: Juhi Escalona MD  Copy of this evaluation report is provided to requesting physician.    {Provider Resources  Preop [facility] Preop Guidelines  Revised Cardiac Risk Index :112315}   {Email feedback regarding this note to [email or web address]   :046810}  "

## 2024-06-06 NOTE — PROGRESS NOTES
Preoperative Evaluation  [facility] [address]  [place] [address]  Phone: [phone]  Fax: [phone]  Primary Provider: Juhi Escalona MD  Pre-op Performing Provider: Juhi Escalona MD  Jun 6, 2024       Fax number for surgical facility: [phone]      Assessment & Plan     The proposed surgical procedure is considered LOW risk.    (Z01.818) Preoperative examination  (primary encounter diagnosis)  Comment: Going for cataract surgery (L eye then R eye). Medical issues addressed as below. Otherwise optimized for surgery at this time.  Plan: follow-up as needed, ok to proceed     (E11.65,  Z79.4) Type 2 diabetes mellitus with hyperglycemia, with long-term current use of insulin (H)  Comment: A1c in office 7.6 and BG good today. Will reduce nighttime Lantus the night before from 12u ->8u, hold short acting the morning of. Should hold Jardiance 3 days before, metformin and Novolog the day before. Can continue all other medications through surgery.  Plan: Basic metabolic panel, Hemoglobin A1c, Albumin         Random Urine Quantitative with Creat Ratio          (I10) Benign essential hypertension  Comment: Not on meds. BP in office today 133/86, well controlled without meds. Optimized for surgery.  Plan: optimized for surgery.       - No identified additional risk factors other than previously addressed         Recommendation  Approval given to proceed with proposed procedure, without further diagnostic evaluation.    Patient medically optimized for surgery at this time.        Bruno Gerard is a 68 year old, presenting for the following:  Pre-Op Exam ([facility], cataract eye surgery for lft eye on 610 /24 for Rt eye and July 8th / 24 fax number [phone])    Surgery information:  Surgery: Cataract surgery  Location: [facility] in [place]  Surgeon: Dr. Roverto Kpaadia  Date of surgery: 6/10/2024 for L eye, 7/8/2024 for R eye  Fax:  [phone]  Recovery at home, son to drive home after procedures      HPI related to upcoming procedure:   - severely uncontrolled T2DM, needs better control before he can undergo procedure  Current medications include:  Diabetes Medication(s)       Biguanides       metFORMIN (GLUCOPHAGE) 1000 MG tablet Take 1 tablet (1,000 mg) by mouth 2 times daily (with meals)       Diabetic Other       glucose (BD GLUCOSE) 4 g chewable tablet Take 1 tablet by mouth every hour as needed for low blood sugar       Insulin       insulin aspart (NOVOLOG PEN) 100 UNIT/ML pen Inject 8 Units Subcutaneous daily with food     insulin glargine (LANTUS PEN) 100 UNIT/ML pen Inject 12 Units Subcutaneous at bedtime Increase by 2 units daily until fasting blood sugar is less than 150 and then continue that same dose.       Sodium-Glucose Co-Transporter 2 (SGLT2) Inhibitors       empagliflozin (JARDIANCE) 10 MG TABS tablet Take 1 tablet (10 mg) by mouth daily        Last A1c -   Lab Results   Component Value Date    A1C 11.5 11/08/2023    A1C 14.7 08/10/2023    A1C 10.9 03/15/2023    A1C 13.1 09/14/2022    A1C 10.7 01/12/2012    A1C Sent to [facility] 02/21/2011   Today's BG - unclear, has been under 135 per son's report using the fingerstick meter  Using insulin daily, 12 units at night, 8 units with meals  Was supposed to get a CGM put on his arm, did not receive instruction  Taking metformin and Jardiance as prescribed          6/6/2024   Pre-Op Questionnaire   Have you ever had a heart attack or stroke? No   Have you ever had surgery on your heart or blood vessels, such as a stent placement, a coronary artery bypass, or surgery on an artery in your head, neck, heart, or legs? No   Do you have chest pain with activity? No   Do you have a history of heart failure? No   Do you currently have a cold, bronchitis or symptoms of other infection? No   Do you have a cough, shortness of breath, or wheezing? No   Do you or anyone in your  family have previous history of blood clots? No   Do you or does anyone in your family have a serious bleeding problem such as prolonged bleeding following surgeries or cuts? No   Have you ever had problems with anemia or been told to take iron pills? No   Have you had any abnormal blood loss such as black, tarry or bloody stools? No   Have you ever had a blood transfusion? No   Are you willing to have a blood transfusion if it is medically needed before, during, or after your surgery? Yes   Have you or any of your relatives ever had problems with anesthesia? No   Do you have sleep apnea, excessive snoring or daytime drowsiness? No   Do you have any artifical heart valves or other implanted medical devices like a pacemaker, defibrillator, or continuous glucose monitor? No   Do you have artificial joints? No   Are you allergic to latex? No     Health Care Directive  Patient does not have a Health Care Directive or Living Will: Discussed advance care planning with patient; information given to patient to review.    Preoperative Review of    reviewed - no record of controlled substances prescribed.    Status of Chronic Conditions:  See problem list for active medical problems.  Problems all longstanding and stable, except as noted/documented.  See ROS for pertinent symptoms related to these conditions.    Patient Active Problem List    Diagnosis Date Noted    Strain of left hamstring muscle, initial encounter 11/12/2023     Priority: Medium    Palpable mass of soft tissue of foot 11/12/2023     Priority: Medium    Benign essential hypertension 03/20/2023     Priority: Medium    Dysfunction of left eustachian tube 03/18/2023     Priority: Medium    Chronic pain of left knee 03/18/2023     Priority: Medium    Financial difficulties 03/18/2023     Priority: Medium    Poor dentition 09/18/2022     Priority: Medium    Type 2 diabetes mellitus with other specified complication, without long-term current use of insulin  (H) 09/18/2022     Priority: Medium      Past Medical History:   Diagnosis Date    Diabetes mellitus, type 2 (H)      No past surgical history on file.  Current Outpatient Medications   Medication Sig Dispense Refill    insulin aspart (NOVOLOG PEN) 100 UNIT/ML pen Inject 8 Units Subcutaneous daily with food 15 mL 1    insulin glargine (LANTUS PEN) 100 UNIT/ML pen Inject 12 Units Subcutaneous at bedtime Increase by 2 units daily until fasting blood sugar is less than 150 and then continue that same dose. 15 mL 0    atorvastatin (LIPITOR) 40 MG tablet Take 1 tablet (40 mg) by mouth daily 90 tablet 3    blood glucose monitoring (NO BRAND SPECIFIED) meter device kit Use to test blood sugar 1 times daily 1 kit 0    Blood Glucose Monitoring Suppl (ACCU-CHEK GUIDE ME) w/Device KIT 1 kit by to test blood sugar route daily      Continuous Blood Gluc  (FREESTYLE WYATT 2 READER) JT Use to read blood sugars as per 's instructions. 1 each 0    Continuous Blood Gluc Sensor (FREESTYLE WYATT 2 SENSOR) MISC Change every 14 days. 2 each 5    empagliflozin (JARDIANCE) 10 MG TABS tablet Take 1 tablet (10 mg) by mouth daily 90 tablet 1    glucose (BD GLUCOSE) 4 g chewable tablet Take 1 tablet by mouth every hour as needed for low blood sugar 10 tablet 1    insulin pen needle (32G X 4 MM) 32G X 4 MM miscellaneous Use 1 pen needles daily 100 each 1    metFORMIN (GLUCOPHAGE) 1000 MG tablet Take 1 tablet (1,000 mg) by mouth 2 times daily (with meals) 180 tablet 4       No Known Allergies     Social History     Tobacco Use    Smoking status: Never     Passive exposure: Never    Smokeless tobacco: Never   Substance Use Topics    Alcohol use: Not on file       History   Drug Use Not on file             Review of Systems  Constitutional, neuro, ENT, endocrine, pulmonary, cardiac, gastrointestinal, genitourinary, musculoskeletal, integument and psychiatric systems are negative, except as otherwise noted.    Objective    BP  "133/86   Pulse 78   Resp 16   Ht 1.62 m (5' 3.78\")   Wt 64.9 kg (143 lb)   SpO2 98%   BMI 24.72 kg/m     Estimated body mass index is 24.72 kg/m  as calculated from the following:    Height as of this encounter: 1.62 m (5' 3.78\").    Weight as of this encounter: 64.9 kg (143 lb).    Physical Exam  General: Alert and oriented x 3, no acute distress  Skin: clean, dry, and intact  HEENT: normocephalic, atraumatic, PERRL, EOMI, no conjunctival injection or icterus, ears and nose normal, no LAD or masses. Teeth do not appear grossly infected.  Resp: clear to ausculation bilaterally, no rales or wheezes  Cardio: RRR, S1 and S2 present, no S3 or S4, no rubs or murmurs  Abdomen: soft, nontender, nondistended, bowel sounds present x 4, no masses, no hepatosplenomegaly  Extremities: no erythema or edema  Psych: normal mood, normal mentation      Recent Labs   Lab Test 11/08/23  1523 08/10/23  1611   NA  --  130*   POTASSIUM  --  4.9   CR  --  0.85   A1C 11.5* 14.7*        Diagnostics    Recent Results (from the past 24 hour(s))   Basic metabolic panel    Collection Time: 06/06/24  9:53 AM   Result Value Ref Range    Sodium 139 135 - 145 mmol/L    Potassium 3.9 3.4 - 5.3 mmol/L    Chloride 103 94 - 109 mmol/L    Carbon Dioxide (CO2) 28 20 - 32 mmol/L    Anion Gap 8 3 - 14 mmol/L    Urea Nitrogen 14 7 - 30 mg/dL    Creatinine 0.70 0.66 - 1.25 mg/dL    GFR Estimate >90 >60 mL/min/1.73m2    Calcium 10.4 (H) 8.5 - 10.1 mg/dL    Glucose 138 (H) 70 - 99 mg/dL      No EKG required for low risk surgery (cataract, skin procedure, breast biopsy, etc).    Revised Cardiac Risk Index (RCRI)  The patient has the following serious cardiovascular risks for perioperative complications:   - Diabetes Mellitus (on Insulin) = 1 point     RCRI Interpretation: 1 point: Class II (low risk - 0.9% complication rate)         Signed Electronically by: Juhi Escalona MD  Copy of this evaluation report is provided to requesting physician.      "

## 2024-06-06 NOTE — TELEPHONE ENCOUNTER
[facility] phone call message- medication clarification/question:    Full Medication Name: insulin aspart (NOVOLOG PEN) 100 UNIT/ML pen     Question: Pharmacy called needing to know how many times daily is patient injecting 8 units, please call and advise or resend script with how many times daily thank you.    Pharmacy confirmed as    [facility] - [place], [place] - [address]: Yes    OK to leave a message on voice mail? Yes    Primary language: Citizen of the [place]      needed? Yes    Call taken on June 6, 2024 at 11:18 AM by Candelario Deutsch

## 2024-06-07 NOTE — PROGRESS NOTES
Pre-op faxed to fax number :   [phone]   Location :  [facility] in [place]  Date of Surgery :  6/10/24 & 7/8/24  By/Date :  Arcelia Bailey CMA

## 2024-06-24 DIAGNOSIS — E11.65 TYPE 2 DIABETES MELLITUS WITH HYPERGLYCEMIA, WITHOUT LONG-TERM CURRENT USE OF INSULIN (H): ICD-10-CM

## 2024-06-24 NOTE — TELEPHONE ENCOUNTER
[facility] phone call message- medication clarification/question:    Full Medication Name:   -atorvastatin (LIPITOR) 40 MG tablet  - metFORMIN (GLUCOPHAGE) 1000 MG tablet   - blood pressure medication (patient could not specify)   - eye drops     Question: Please refill      Pharmacy confirmed as    [id number] - [place], [place] - [address]    : Yes    OK to leave a message on voice mail? Yes    Primary language: Mongolian      needed? Yes    Call taken on June 24, 2024 at 3:44 PM by Mindi Deutsch

## 2024-06-25 RX ORDER — ATORVASTATIN CALCIUM 40 MG/1
40 TABLET, FILM COATED ORAL DAILY
Qty: 90 TABLET | Refills: 3 | Status: SHIPPED | OUTPATIENT
Start: 2024-06-25

## 2024-08-15 ENCOUNTER — TELEPHONE (OUTPATIENT)
Dept: FAMILY MEDICINE | Facility: CLINIC | Age: 69
End: 2024-08-15
Payer: COMMERCIAL

## 2024-08-15 NOTE — TELEPHONE ENCOUNTER
Patient Quality Outreach    Patient is due for the following:   Diabetes -  A1C    Next Steps:   Schedule a office visit for diabetes follow up - on or after 9/6/2024     Lab Results   Component Value Date    A1C 7.8 06/06/2024    A1C 11.5 11/08/2023    A1C 14.7 08/10/2023    A1C 10.9 03/15/2023    A1C 13.1 09/14/2022    A1C 10.7 01/12/2012         Type of outreach:    Route to  team       Questions for provider review:    None           Fani Rosenthal, [facility]

## 2024-09-10 NOTE — TELEPHONE ENCOUNTER
Patient Quality Outreach    Patient is due for the following:   Diabetes -  A1C    Next Steps:   Schedule a office visit for A1C    Type of outreach:    Phone, left message for patient/parent to call back.    Questions for provider review:               Britany Lin MA  Chart routed to Care Team.

## 2024-09-10 NOTE — TELEPHONE ENCOUNTER
Called pt to talk about making appt for A1C check and to discuss medication cost.  LVMTCB to ask for me to talk about concerns about medication cost

## 2025-03-25 DIAGNOSIS — E11.69 TYPE 2 DIABETES MELLITUS WITH OTHER SPECIFIED COMPLICATION, WITHOUT LONG-TERM CURRENT USE OF INSULIN (H): Primary | ICD-10-CM

## 2025-04-11 ENCOUNTER — LAB REQUISITION (OUTPATIENT)
Dept: LAB | Facility: CLINIC | Age: 70
End: 2025-04-11
Payer: COMMERCIAL

## 2025-04-11 DIAGNOSIS — E11.65 TYPE 2 DIABETES MELLITUS WITH HYPERGLYCEMIA (H): ICD-10-CM

## 2025-04-11 PROCEDURE — 80053 COMPREHEN METABOLIC PANEL: CPT | Mod: ORL | Performed by: FAMILY MEDICINE

## 2025-04-11 PROCEDURE — 80061 LIPID PANEL: CPT | Mod: ORL | Performed by: FAMILY MEDICINE

## 2025-04-12 LAB
ALBUMIN SERPL BCG-MCNC: 4 G/DL (ref 3.5–5.2)
ALP SERPL-CCNC: 90 U/L (ref 40–150)
ALT SERPL W P-5'-P-CCNC: 20 U/L (ref 0–70)
ANION GAP SERPL CALCULATED.3IONS-SCNC: 14 MMOL/L (ref 7–15)
AST SERPL W P-5'-P-CCNC: 14 U/L (ref 0–45)
BILIRUB SERPL-MCNC: 0.4 MG/DL
BUN SERPL-MCNC: 18.4 MG/DL (ref 8–23)
CALCIUM SERPL-MCNC: 9.7 MG/DL (ref 8.8–10.4)
CHLORIDE SERPL-SCNC: 95 MMOL/L (ref 98–107)
CHOLEST SERPL-MCNC: 198 MG/DL
CREAT SERPL-MCNC: 0.79 MG/DL (ref 0.67–1.17)
EGFRCR SERPLBLD CKD-EPI 2021: >90 ML/MIN/1.73M2
FASTING STATUS PATIENT QL REPORTED: NO
FASTING STATUS PATIENT QL REPORTED: NO
GLUCOSE SERPL-MCNC: 529 MG/DL (ref 70–99)
HCO3 SERPL-SCNC: 23 MMOL/L (ref 22–29)
HDLC SERPL-MCNC: 46 MG/DL
LDLC SERPL CALC-MCNC: 140 MG/DL
NONHDLC SERPL-MCNC: 152 MG/DL
POTASSIUM SERPL-SCNC: 4.4 MMOL/L (ref 3.4–5.3)
PROT SERPL-MCNC: 6.8 G/DL (ref 6.4–8.3)
SODIUM SERPL-SCNC: 132 MMOL/L (ref 135–145)
TRIGL SERPL-MCNC: 62 MG/DL

## 2025-04-14 ENCOUNTER — PATIENT OUTREACH (OUTPATIENT)
Dept: CARE COORDINATION | Facility: CLINIC | Age: 70
End: 2025-04-14
Payer: MEDICARE

## 2025-04-14 NOTE — PROGRESS NOTES
Clinic Care Coordination Contact  Care Team Conversations    Patient identified for care management outreach, however, patient is not part of a value-based contact therefore outreach cannot be completed by  CC. Will escalate to clinic staff if specific needs or resources are indicated.    Radha Mckeon, [facility]  Social Work Care Coordinator - [facility]  Care Coordination  [email or web address]  Cell Phone: [phone]  Gender pronouns: she/her  Employed by [employer]

## 2025-07-08 ENCOUNTER — TELEPHONE (OUTPATIENT)
Dept: FAMILY MEDICINE | Facility: CLINIC | Age: 70
End: 2025-07-08
Payer: COMMERCIAL

## 2025-07-08 NOTE — TELEPHONE ENCOUNTER
"Per edwige [facility] asked for us to call pt to see if he is now going to [place]. Called pt w/ - 1st call pt did not speak, hung up. 2nd call pt kept asking \"what is this about\"-- when I stated calling from [place], pt disconnected   "